# Patient Record
Sex: MALE | Race: BLACK OR AFRICAN AMERICAN | NOT HISPANIC OR LATINO | Employment: UNEMPLOYED | URBAN - METROPOLITAN AREA
[De-identification: names, ages, dates, MRNs, and addresses within clinical notes are randomized per-mention and may not be internally consistent; named-entity substitution may affect disease eponyms.]

---

## 2018-01-07 ENCOUNTER — HOSPITAL ENCOUNTER (EMERGENCY)
Facility: CLINIC | Age: 33
Discharge: HOME | End: 2018-01-07
Attending: PHYSICIAN ASSISTANT | Admitting: PHYSICIAN ASSISTANT
Payer: COMMERCIAL

## 2018-01-07 VITALS
RESPIRATION RATE: 18 BRPM | TEMPERATURE: 98.9 F | OXYGEN SATURATION: 99 % | DIASTOLIC BLOOD PRESSURE: 90 MMHG | SYSTOLIC BLOOD PRESSURE: 134 MMHG

## 2018-01-07 DIAGNOSIS — B34.9 VIRAL ILLNESS: ICD-10-CM

## 2018-01-07 DIAGNOSIS — R09.81 NASAL CONGESTION: ICD-10-CM

## 2018-01-07 LAB
FLUAV+FLUBV AG SPEC QL: NEGATIVE
FLUAV+FLUBV AG SPEC QL: NEGATIVE
SPECIMEN SOURCE: NORMAL

## 2018-01-07 PROCEDURE — 25000125 ZZHC RX 250: Performed by: PHYSICIAN ASSISTANT

## 2018-01-07 PROCEDURE — 87804 INFLUENZA ASSAY W/OPTIC: CPT | Performed by: EMERGENCY MEDICINE

## 2018-01-07 PROCEDURE — 99283 EMERGENCY DEPT VISIT LOW MDM: CPT

## 2018-01-07 RX ORDER — ONDANSETRON 4 MG/1
4 TABLET, ORALLY DISINTEGRATING ORAL ONCE
Status: COMPLETED | OUTPATIENT
Start: 2018-01-07 | End: 2018-01-07

## 2018-01-07 RX ORDER — ONDANSETRON 4 MG/1
4 TABLET, ORALLY DISINTEGRATING ORAL EVERY 8 HOURS PRN
Qty: 10 TABLET | Refills: 0 | Status: SHIPPED | OUTPATIENT
Start: 2018-01-07 | End: 2018-01-10

## 2018-01-07 RX ADMIN — ONDANSETRON 4 MG: 4 TABLET, ORALLY DISINTEGRATING ORAL at 16:55

## 2018-01-07 ASSESSMENT — ENCOUNTER SYMPTOMS
COUGH: 1
HEADACHES: 1
DIARRHEA: 0
VOMITING: 1
CONSTIPATION: 0
SORE THROAT: 1
FEVER: 1
APPETITE CHANGE: 0
ABDOMINAL PAIN: 0

## 2018-01-07 NOTE — ED AVS SNAPSHOT
[facility] Emergency Department    [address]    [facility] [address]    Phone:  [phone]    Fax:  [phone]                                       Bahman Wolf   MRN: [id number]    Department:  [facility] Emergency Department   Date of Visit:  1/7/2018           Patient Information     Date Of Birth          1985        Your diagnoses for this visit were:     Viral illness     Nasal congestion        You were seen by Domi Minaya PA-C.      Follow-up Information     Follow up with [facility] In 2 days.    Specialties:  Sports Medicine, Pain Management, Obstetrics & Gynecology, Pediatrics, Internal Medicine, Nephrology    Why:  As needed    Contact information:    [address]  [facility] [address] [phone]        Follow up with [facility] Emergency Department.    Specialty:  EMERGENCY MEDICINE    Why:  If symptoms worsen    Contact information:    [address]  [facility] [address] [phone]        Discharge Instructions         You can try an antihistamine plus decongestant (Allegra/Zyrtec/Claritin-D) for stuffy nose. Antihistamine dries up the runny nose and decongestant helps with the stuffiness. Also add on a steroid nasal spray (Flonase/Nasacort) for extra help.   You can take Ibuprofen 600 mg three times daily and/or Tylenol, alternating every 3-4 hours, for pain/fevers/body aches. No more than 4000 mg of Tylenol in 24 hours and no more than 3200 mg Ibuprofen in 24 hours.    Viral Syndrome (Adult)  A viral illness may cause a number of symptoms. The symptoms depend on the part of the body that the virus affects. If it settles in your nose, throat, and lungs, it may cause cough, sore throat, congestion, and sometimes headache. If it settles in your stomach and intestinal tract, it may cause vomiting and diarrhea. Sometimes it causes vague  "symptoms like \"aching all over,\" feeling tired, loss of appetite, or fever.  A viral illness usually lasts 1 to 2 weeks, but sometimes it lasts longer. In some cases, a more serious infection can look like a viral syndrome in the first few days of the illness. You may need another exam and additional tests to know the difference. Watch for the warning signs listed below.  Home care  Follow these guidelines for taking care of yourself at home:    If symptoms are severe, rest at home for the first 2 to 3 days.    Stay away from cigarette smoke - both your smoke and the smoke from others.    You may use over-the-counter acetaminophen or ibuprofen for fever, muscle aching, and headache, unless another medicine was prescribed for this. If you have chronic liver or kidney disease or ever had a stomach ulcer or GI bleeding, talk with your doctor before using these medicines. No one who is younger than 18 and ill with a fever should take aspirin. It may cause severe disease or death.    Your appetite may be poor, so a light diet is fine. Avoid dehydration by drinking 8 to 12 8-ounce glasses of fluids each day. This may include water; orange juice; lemonade; apple, grape, and cranberry juice; clear fruit drinks; electrolyte replacement and sports drinks; and decaffeinated teas and coffee. If you have been diagnosed with a kidney disease, ask your doctor how much and what types of fluids you should drink to prevent dehydration. If you have kidney disease, drinking too much fluid can cause it build up in the your body and be dangerous to your health.    Over-the-counter remedies won't shorten the length of the illness but may be helpful for cough, sore throat; and nasal and sinus congestion. Don't use decongestants if you have high blood pressure.  Follow-up care  Follow up with your healthcare provider if you do not improve over the next week.  Call 911  Get emergency medical care if any of the following " occur:    Convulsion    Feeling weak, dizzy, or like you are going to faint    Chest pain, shortness of breath, wheezing, or difficulty breathing  When to seek medical advice  Call your healthcare provider right away if any of these occur:    Cough with lots of colored sputum (mucus) or blood in your sputum    Chest pain, shortness of breath, wheezing, or difficulty breathing    Severe headache; face, neck, or ear pain    Severe, constant pain in the lower right side of your belly (abdominal)    Continued vomiting (can t keep liquids down)    Frequent diarrhea (more than 5 times a day); blood (red or black color) or mucus in diarrhea    Feeling weak, dizzy, or like you are going to faint    Extreme thirst    Fever of 100.4 F (38 C) or higher, or as directed by your healthcare provider  Date Last Reviewed: 9/25/2015 2000-2017 [employer]. [address] [place] [address]. All rights reserved. This information is not intended as a substitute for professional medical care. Always follow your healthcare professional's instructions.          24 Hour Appointment Hotline       To make an appointment at any [facility], call [phone] ([phone]). If you don't have a family doctor or clinic, we will help you find one. [facility] are conveniently located to serve the needs of you and your family.             Review of your medicines      START taking        Dose / Directions Last dose taken    ondansetron 4 MG ODT tab   Commonly known as:  ZOFRAN ODT   Dose:  4 mg   Quantity:  10 tablet        Take 1 tablet (4 mg) by mouth every 8 hours as needed   Refills:  0          Our records show that you are taking the medicines listed below. If these are incorrect, please call your family doctor or clinic.        Dose / Directions Last dose taken    dextromethorphan-guaiFENesin  MG per 12 hr tablet   Commonly known as:  MUCINEX DM   Dose:  1 tablet        Take 1 tablet by mouth  every 12 hours   Refills:  0        HYDROcodone-acetaminophen 5-325 MG per tablet   Commonly known as:  NORCO   Dose:  1-2 tablet   Quantity:  15 tablet        Take 1-2 tablets by mouth every 4 hours as needed for moderate to severe pain   Refills:  0        ibuprofen 600 MG tablet   Commonly known as:  ADVIL/MOTRIN   Dose:  600 mg   Quantity:  30 tablet        Take 1 tablet (600 mg) by mouth every 6 hours as needed for moderate pain   Refills:  1        oxyCODONE-acetaminophen 5-325 MG per tablet   Commonly known as:  PERCOCET   Dose:  1-2 tablet   Quantity:  30 tablet        Take 1-2 tablets by mouth every 4 hours as needed for moderate to severe pain   Refills:  0                Prescriptions were sent or printed at these locations (1 Prescription)                   Other Prescriptions                Printed at Department/Unit printer (1 of 1)         ondansetron (ZOFRAN ODT) 4 MG ODT tab                Procedures and tests performed during your visit     Influenza A/B antigen      Orders Needing Specimen Collection     None      Pending Results     No orders found from 1/5/2018 to 1/8/2018.            Pending Culture Results     No orders found from 1/5/2018 to 1/8/2018.            Pending Results Instructions     If you had any lab results that were not finalized at the time of your Discharge, you can call the ED Lab Result RN at [phone]. You will be contacted by this team for any positive Lab results or changes in treatment. The nurses are available 7 days a week from 10A to 6:30P.  You can leave a message 24 hours per day and they will return your call.        Test Results From Your Hospital Stay        1/7/2018  4:42 PM      Component Results     Component Value Ref Range & Units Status    Influenza A/B Agn Specimen Nasopharyngeal  Final    Influenza A Negative NEG^Negative Final    Influenza B Negative NEG^Negative Final    Test results must be correlated with clinical data. If necessary, results    should be confirmed by a molecular assay or viral culture.                  Clinical Quality Measure: Blood Pressure Screening     Your blood pressure was checked while you were in the emergency department today. The last reading we obtained was  BP: 134/90 . Please read the guidelines below about what these numbers mean and what you should do about them.  If your systolic blood pressure (the top number) is less than 120 and your diastolic blood pressure (the bottom number) is less than 80, then your blood pressure is normal. There is nothing more that you need to do about it.  If your systolic blood pressure (the top number) is 120-139 or your diastolic blood pressure (the bottom number) is 80-89, your blood pressure may be higher than it should be. You should have your blood pressure rechecked within a year by a primary care provider.  If your systolic blood pressure (the top number) is 140 or greater or your diastolic blood pressure (the bottom number) is 90 or greater, you may have high blood pressure. High blood pressure is treatable, but if left untreated over time it can put you at risk for heart attack, stroke, or kidney failure. You should have your blood pressure rechecked by a primary care provider within the next 4 weeks.  If your provider in the emergency department today gave you specific instructions to follow-up with your doctor or provider even sooner than that, you should follow that instruction and not wait for up to 4 weeks for your follow-up visit.        Thank you for choosing [facility]       Thank you for choosing [facility] for your care. Our goal is always to provide you with excellent care. Hearing back from our patients is one way we can continue to improve our services. Please take a few minutes to complete the written survey that you may receive in the mail after you visit with us. Thank you!        [employer] Information     [employer] lets you send messages to your doctor, view your test  "results, renew your prescriptions, schedule appointments and more. To sign up, go to [email or web address] . Click on \"Log in\" on the left side of the screen, which will take you to the Welcome page. Then click on \"Sign up Now\" on the right side of the page.     You will be asked to enter the access code listed below, as well as some personal information. Please follow the directions to create your username and password.     Your access code is: [email or web address]  Expires: 2018  4:57 PM     Your access code will  in 90 days. If you need help or a new code, please call your [facility] clinic or [phone].        Care EveryWhere ID     This is your Care EveryWhere ID. This could be used by other organizations to access your [facility] medical records  [id number]        Equal Access to Services     LINA MEADOWS : Hadjamal Lester, cierra sanon, sylwia fayealmaabbe mckeon, victor hugo cabrera . So [facility] [phone].    ATENCIÓN: Si habla español, tiene a anderson disposición servicios gratuitos de asistencia lingüística. Llame al [phone].    We comply with applicable federal civil rights laws and [place] laws. We do not discriminate on the basis of race, color, national origin, age, disability, sex, sexual orientation, or gender identity.            After Visit Summary       This is your record. Keep this with you and show to your community pharmacist(s) and doctor(s) at your next visit.                  "

## 2018-01-07 NOTE — ED AVS SNAPSHOT
[facility] Emergency Department    [address]    [facility] [address]    Phone:  [phone]    Fax:  [phone]                                       Bahman Wolf   MRN: [id number]    Department:  [facility] Emergency Department   Date of Visit:  1/7/2018           After Visit Summary Signature Page     I have received my discharge instructions, and my questions have been answered. I have discussed any challenges I see with this plan with the nurse or doctor.    ..........................................................................................................................................  Patient/Patient Representative Signature      ..........................................................................................................................................  Patient Representative Print Name and Relationship to Patient    ..................................................               ................................................  Date                                            Time    ..........................................................................................................................................  Reviewed by Signature/Title    ...................................................              ..............................................  Date                                                            Time

## 2018-01-07 NOTE — DISCHARGE INSTRUCTIONS
"  You can try an antihistamine plus decongestant (Allegra/Zyrtec/Claritin-D) for stuffy nose. Antihistamine dries up the runny nose and decongestant helps with the stuffiness. Also add on a steroid nasal spray (Flonase/Nasacort) for extra help.   You can take Ibuprofen 600 mg three times daily and/or Tylenol, alternating every 3-4 hours, for pain/fevers/body aches. No more than 4000 mg of Tylenol in 24 hours and no more than 3200 mg Ibuprofen in 24 hours.    Viral Syndrome (Adult)  A viral illness may cause a number of symptoms. The symptoms depend on the part of the body that the virus affects. If it settles in your nose, throat, and lungs, it may cause cough, sore throat, congestion, and sometimes headache. If it settles in your stomach and intestinal tract, it may cause vomiting and diarrhea. Sometimes it causes vague symptoms like \"aching all over,\" feeling tired, loss of appetite, or fever.  A viral illness usually lasts 1 to 2 weeks, but sometimes it lasts longer. In some cases, a more serious infection can look like a viral syndrome in the first few days of the illness. You may need another exam and additional tests to know the difference. Watch for the warning signs listed below.  Home care  Follow these guidelines for taking care of yourself at home:    If symptoms are severe, rest at home for the first 2 to 3 days.    Stay away from cigarette smoke - both your smoke and the smoke from others.    You may use over-the-counter acetaminophen or ibuprofen for fever, muscle aching, and headache, unless another medicine was prescribed for this. If you have chronic liver or kidney disease or ever had a stomach ulcer or GI bleeding, talk with your doctor before using these medicines. No one who is younger than 18 and ill with a fever should take aspirin. It may cause severe disease or death.    Your appetite may be poor, so a light diet is fine. Avoid dehydration by drinking 8 to 12 8-ounce glasses of fluids each " day. This may include water; orange juice; lemonade; apple, grape, and cranberry juice; clear fruit drinks; electrolyte replacement and sports drinks; and decaffeinated teas and coffee. If you have been diagnosed with a kidney disease, ask your doctor how much and what types of fluids you should drink to prevent dehydration. If you have kidney disease, drinking too much fluid can cause it build up in the your body and be dangerous to your health.    Over-the-counter remedies won't shorten the length of the illness but may be helpful for cough, sore throat; and nasal and sinus congestion. Don't use decongestants if you have high blood pressure.  Follow-up care  Follow up with your healthcare provider if you do not improve over the next week.  Call 911  Get emergency medical care if any of the following occur:    Convulsion    Feeling weak, dizzy, or like you are going to faint    Chest pain, shortness of breath, wheezing, or difficulty breathing  When to seek medical advice  Call your healthcare provider right away if any of these occur:    Cough with lots of colored sputum (mucus) or blood in your sputum    Chest pain, shortness of breath, wheezing, or difficulty breathing    Severe headache; face, neck, or ear pain    Severe, constant pain in the lower right side of your belly (abdominal)    Continued vomiting (can t keep liquids down)    Frequent diarrhea (more than 5 times a day); blood (red or black color) or mucus in diarrhea    Feeling weak, dizzy, or like you are going to faint    Extreme thirst    Fever of 100.4 F (38 C) or higher, or as directed by your healthcare provider  Date Last Reviewed: 9/25/2015 2000-2017 [employer]. [address] [place] [address], [place] [address]. All rights reserved. This information is not intended as a substitute for professional medical care. Always follow your healthcare professional's instructions.

## 2018-01-07 NOTE — ED PROVIDER NOTES
History     Chief Complaint:  Cough    HPI   Bahman Wolf is a 33 year old male who presents to the emergency department today for evaluation of a cough. The patient reports four days ago he developed cold like symptoms with associated subjective fever, congestion, sore throat, headache, and mild cough. Yesterday he began vomiting after eating and continues feeling somewhat nauseated. He had two episodes yesterday and two today as well that are associated with eating. The patient started taking Mucinex yesterday, however, with persistent symptoms he presents to the ED for further evaluation. He is still nauseated. The patient denies ear pain, diarrhea, abdominal pain, appetite change, and known sick contacts.     Allergies:  No Known Drug Allergies     Medications:    dextromethorphan-guaiFENesin (MUCINEX DM)  MG per 12 hr tablet    Past Medical History:    History reviewed. No pertinent past medical history.    Past Surgical History:    History reviewed. No pertinent surgical history.    Family History:    History reviewed. No pertinent family history.     Social History:  The patient was accompanied to the ED by his significant other.  Smoking Status: Former  Alcohol Use: No  Marital Status:  Single      Review of Systems   Constitutional: Positive for fever (subjective). Negative for appetite change.   HENT: Positive for congestion and sore throat. Negative for ear pain.    Respiratory: Positive for cough.    Gastrointestinal: Positive for vomiting. Negative for abdominal pain, constipation and diarrhea.   Neurological: Positive for headaches.   All other systems reviewed and are negative.    Physical Exam   First Vitals:  BP: 134/90  Heart Rate: 65  Temp: 98.9  F (37.2  C)  Resp: 18  SpO2: 99 %    Physical Exam  Constitutional: Alert, attentive  HENT:    Nose: Nose normal.    Mouth/Throat: Oropharynx is clear, mucous membranes are moist. Tonsils erythematous.   Eyes: EOM are normal. Pupils are  equal, round, and reactive to light.   CV: regular rate and rhythm  Chest: Effort normal and breath sounds normal.   GI:  There is no tenderness. No distension. Normal bowel sounds  MSK: Normal range of motion.   Neurological: Alert, attentive  Skin: Skin is warm and dry.     Emergency Department Course     Laboratory:  Laboratory findings were communicated with the patient and family who voiced understanding of the findings.  Influenza A/B Antigen: Negative     Interventions:  1655 Zofran 4mg PO      Emergency Department Course:  Nursing notes and vitals reviewed.  1648: I performed an exam of the patient as documented above. Patient updated on laboratory results.   Findings and plan explained to the Patient. Patient discharged home with instructions regarding supportive care, medications, and reasons to return. The importance of close follow-up was reviewed. The patient was prescribed zofran.   I personally reviewed the laboratory results with the Patient and answered all related questions prior to discharge.    Impression & Plan      Medical Decision Making:  Bahman Wolf is a 33 year old male who presents for evaluation of cough and fever. This is consistent with an influenza like illness.  The patient is out of treatment window for influenza and medications ordered as noted above. Influenza testing is negative. Patient understands the sensitivity of influenza rapid test.  They are at risk for pneumonia but no signs of this are detected on today's visit. No indication for antibiotics.  Close followup of primary care physician is indicated and return to the ED for high fevers > 103 for more than 48 hours more, increasing productive cough, shortness of breath, or confusion.  There is no signs of serious bacterial infection such as bacteremia, meningitis, UTI/pyelonephritis, strep pharyngitis, etc.      Diagnosis:    ICD-10-CM    1. Viral illness B34.9    2. Nasal congestion R09.81         Disposition:  Discharged to home    Discharge Medications:  New Prescriptions    ONDANSETRON (ZOFRAN ODT) 4 MG ODT TAB    Take 1 tablet (4 mg) by mouth every 8 hours as needed     Scribe Disclosure:  I, Elva Medeiros, am serving as a scribe at 4:23 PM on 1/7/2018 to document services personally performed by Domi Minaya PA-C based on my observations and the provider's statements to me.     1/7/2018   [facility] EMERGENCY DEPARTMENT       Domi Minaya PA-C  01/07/18 1715

## 2018-01-07 NOTE — ED NOTES
Reports nasal congestion progressively worsening x 4 days. Also reports has had some n/v over the past 2 days.

## 2018-04-05 ENCOUNTER — HOSPITAL ENCOUNTER (EMERGENCY)
Facility: CLINIC | Age: 33
Discharge: HOME | End: 2018-04-05
Attending: EMERGENCY MEDICINE | Admitting: EMERGENCY MEDICINE
Payer: COMMERCIAL

## 2018-04-05 VITALS
OXYGEN SATURATION: 98 % | HEART RATE: 72 BPM | RESPIRATION RATE: 14 BRPM | SYSTOLIC BLOOD PRESSURE: 138 MMHG | DIASTOLIC BLOOD PRESSURE: 78 MMHG | TEMPERATURE: 98.7 F

## 2018-04-05 DIAGNOSIS — R20.2 PARESTHESIAS: ICD-10-CM

## 2018-04-05 LAB
ANION GAP SERPL CALCULATED.3IONS-SCNC: 5 MMOL/L (ref 3–14)
BASOPHILS # BLD AUTO: 0.1 10E9/L (ref 0–0.2)
BASOPHILS NFR BLD AUTO: 1.1 %
BUN SERPL-MCNC: 15 MG/DL (ref 7–30)
CALCIUM SERPL-MCNC: 8.5 MG/DL (ref 8.5–10.1)
CHLORIDE SERPL-SCNC: 106 MMOL/L (ref 94–109)
CO2 SERPL-SCNC: 31 MMOL/L (ref 20–32)
CREAT SERPL-MCNC: 0.6 MG/DL (ref 0.66–1.25)
DIFFERENTIAL METHOD BLD: NORMAL
EOSINOPHIL # BLD AUTO: 0.4 10E9/L (ref 0–0.7)
EOSINOPHIL NFR BLD AUTO: 3.7 %
ERYTHROCYTE [DISTWIDTH] IN BLOOD BY AUTOMATED COUNT: 12.2 % (ref 10–15)
GFR SERPL CREATININE-BSD FRML MDRD: >90 ML/MIN/1.7M2
GLUCOSE SERPL-MCNC: 110 MG/DL (ref 70–99)
HCT VFR BLD AUTO: 43.5 % (ref 40–53)
HGB BLD-MCNC: 14.7 G/DL (ref 13.3–17.7)
IMM GRANULOCYTES # BLD: 0 10E9/L (ref 0–0.4)
IMM GRANULOCYTES NFR BLD: 0.2 %
LYMPHOCYTES # BLD AUTO: 3.3 10E9/L (ref 0.8–5.3)
LYMPHOCYTES NFR BLD AUTO: 33.5 %
MCH RBC QN AUTO: 28 PG (ref 26.5–33)
MCHC RBC AUTO-ENTMCNC: 33.8 G/DL (ref 31.5–36.5)
MCV RBC AUTO: 83 FL (ref 78–100)
MONOCYTES # BLD AUTO: 0.8 10E9/L (ref 0–1.3)
MONOCYTES NFR BLD AUTO: 7.6 %
NEUTROPHILS # BLD AUTO: 5.3 10E9/L (ref 1.6–8.3)
NEUTROPHILS NFR BLD AUTO: 53.9 %
NRBC # BLD AUTO: 0 10*3/UL
NRBC BLD AUTO-RTO: 0 /100
PLATELET # BLD AUTO: 310 10E9/L (ref 150–450)
POTASSIUM SERPL-SCNC: 3.5 MMOL/L (ref 3.4–5.3)
RBC # BLD AUTO: 5.25 10E12/L (ref 4.4–5.9)
SODIUM SERPL-SCNC: 142 MMOL/L (ref 133–144)
WBC # BLD AUTO: 9.9 10E9/L (ref 4–11)

## 2018-04-05 PROCEDURE — 80048 BASIC METABOLIC PNL TOTAL CA: CPT | Performed by: EMERGENCY MEDICINE

## 2018-04-05 PROCEDURE — 85025 COMPLETE CBC W/AUTO DIFF WBC: CPT | Performed by: EMERGENCY MEDICINE

## 2018-04-05 PROCEDURE — 25000132 ZZH RX MED GY IP 250 OP 250 PS 637: Performed by: EMERGENCY MEDICINE

## 2018-04-05 PROCEDURE — 99283 EMERGENCY DEPT VISIT LOW MDM: CPT

## 2018-04-05 RX ORDER — IBUPROFEN 600 MG/1
600 TABLET, FILM COATED ORAL ONCE
Status: COMPLETED | OUTPATIENT
Start: 2018-04-05 | End: 2018-04-05

## 2018-04-05 RX ADMIN — IBUPROFEN 600 MG: 600 TABLET ORAL at 23:43

## 2018-04-05 ASSESSMENT — ENCOUNTER SYMPTOMS
NAUSEA: 0
SEIZURES: 0
HEADACHES: 0
VOMITING: 0
FEVER: 0
SHORTNESS OF BREATH: 0
NUMBNESS: 1
WEAKNESS: 0

## 2018-04-05 NOTE — ED AVS SNAPSHOT
[facility] Emergency Department    [address]    [facility] [address]    Phone:  [phone]    Fax:  [phone]                                       Bahman Wolf   MRN: [id number]    Department:  [facility] Emergency Department   Date of Visit:  4/5/2018           After Visit Summary Signature Page     I have received my discharge instructions, and my questions have been answered. I have discussed any challenges I see with this plan with the nurse or doctor.    ..........................................................................................................................................  Patient/Patient Representative Signature      ..........................................................................................................................................  Patient Representative Print Name and Relationship to Patient    ..................................................               ................................................  Date                                            Time    ..........................................................................................................................................  Reviewed by Signature/Title    ...................................................              ..............................................  Date                                                            Time

## 2018-04-05 NOTE — ED AVS SNAPSHOT
" [facility] Emergency Department    [address] [place] [address]    Phone:  [phone]    Fax:  [phone]                                       Bahman Wolf   MRN: [id number]    Department:  [facility] Emergency Department   Date of Visit:  4/5/2018           Patient Information     Date Of Birth          1985        Your diagnoses for this visit were:     Paresthesias        You were seen by Rk Hebert MD.      Follow-up Information     Follow up with [facility] [place] In 2 days.    Specialty:  Family Practice    Contact information:    [address]  [place] [address]  [phone]          Follow up with [facility] Emergency Department.    Specialty:  EMERGENCY MEDICINE    Why:  if progressive numbness or weakness moving up the leg    Contact information:    [address] [place] [address]  [phone]        Discharge Instructions         Paraesthesias  Paraesthesia is a burning or prickling sensation that is sometimes felt in the hands, arms, legs or feet. It can also occur in other parts of the body. It can also feel like tingling or numbness, skin crawling, or itching. The feeling is not comfortable, but it is not painful. (The \"pins and needles\" feeling that happens when a foot or hand \"falls asleep\" is a temporary paraesthesia.)  Paraesthesias that last or come and go may be caused by medical issues that need to be treated. These include stroke, a bulging disk pressing on a nerve, a trapped nerve, vitamin deficiencies, or even certain medicines.  Tests are often done. These tests may include blood tests, X-ray, CT (computerized tomography) scan, or a muscle test (electromyography). Depending on the cause, treatment may include physical therapy.  Home care    Tell the healthcare provider about all medicines you take. This includes prescription and over-the-counter medicines, vitamins, " and herbs. Ask if any of the medicines may be causing your problems. Do not make any changes to prescription medicines without talking to your healthcare provider first.    You may be prescribed medicines to help relieve the tingling feeling or for pain. Take all medicines as directed.    A numb hand or foot may be more prone to injury. To help protect it:    Always use oven mitts.    Test water with an unaffected hand or foot.    Use caution when trimming nails. File sharp areas.    Wear shoes that fit well to avoid pressure points, blisters, and ulcers.    Inspect your hands and feet carefully (including the soles of your feet and between your toes) at least once a week. If you see red areas, sores, or other problems, tell your healthcare provider.  Follow-up care  Follow up with your doctor or as advised by our staff. You may need further testing or evaluation.  When to seek medical advice  Call your healthcare provider right away if any of the following occur:    Numbness or weakness of the face, one arm, or one leg    Slurred speech, confusion, trouble speaking, walking, or seeing    Severe headache, fainting spell, dizziness, or seizure    Chest, arm, neck, or upper back pain    Loss of bladder or bowel control    Open wound with redness, swelling, or pus  Date Last Reviewed: 9/25/2015 2000-2017 [employer]. [address] [place] [address]. All rights reserved. This information is not intended as a substitute for professional medical care. Always follow your healthcare professional's instructions.          24 Hour Appointment Hotline       To make an appointment at any [facility], call [phone] ([phone]). If you don't have a family doctor or clinic, we will help you find one. [facility] clinics are conveniently located to serve the needs of you and your family.             Review of your medicines      Our records show that you are taking the medicines listed below.  If these are incorrect, please call your family doctor or clinic.        Dose / Directions Last dose taken    dextromethorphan-guaiFENesin  MG per 12 hr tablet   Commonly known as:  MUCINEX DM   Dose:  1 tablet        Take 1 tablet by mouth every 12 hours   Refills:  0        HYDROcodone-acetaminophen 5-325 MG per tablet   Commonly known as:  NORCO   Dose:  1-2 tablet   Quantity:  15 tablet        Take 1-2 tablets by mouth every 4 hours as needed for moderate to severe pain   Refills:  0        ibuprofen 600 MG tablet   Commonly known as:  ADVIL/MOTRIN   Dose:  600 mg   Quantity:  30 tablet        Take 1 tablet (600 mg) by mouth every 6 hours as needed for moderate pain   Refills:  1        oxyCODONE-acetaminophen 5-325 MG per tablet   Commonly known as:  PERCOCET   Dose:  1-2 tablet   Quantity:  30 tablet        Take 1-2 tablets by mouth every 4 hours as needed for moderate to severe pain   Refills:  0                Procedures and tests performed during your visit     Basic metabolic panel (BMP)    CBC + differential      Orders Needing Specimen Collection     None      Pending Results     No orders found from 4/3/2018 to 4/6/2018.            Pending Culture Results     No orders found from 4/3/2018 to 4/6/2018.            Pending Results Instructions     If you had any lab results that were not finalized at the time of your Discharge, you can call the ED Lab Result RN at [phone]. You will be contacted by this team for any positive Lab results or changes in treatment. The nurses are available 7 days a week from 10A to 6:30P.  You can leave a message 24 hours per day and they will return your call.        Test Results From Your Hospital Stay        4/5/2018 10:28 PM      Component Results     Component Value Ref Range & Units Status    WBC 9.9 4.0 - 11.0 10e9/L Final    RBC Count 5.25 4.4 - 5.9 10e12/L Final    Hemoglobin 14.7 13.3 - 17.7 g/dL Final    Hematocrit 43.5 40.0 - 53.0 % Final    MCV 83 78 -  100 fl Final    MCH 28.0 26.5 - 33.0 pg Final    MCHC 33.8 31.5 - 36.5 g/dL Final    RDW 12.2 10.0 - 15.0 % Final    Platelet Count 310 150 - 450 10e9/L Final    Diff Method Automated Method  Final    % Neutrophils 53.9 % Final    % Lymphocytes 33.5 % Final    % Monocytes 7.6 % Final    % Eosinophils 3.7 % Final    % Basophils 1.1 % Final    % Immature Granulocytes 0.2 % Final    Nucleated RBCs 0 0 /100 Final    Absolute Neutrophil 5.3 1.6 - 8.3 10e9/L Final    Absolute Lymphocytes 3.3 0.8 - 5.3 10e9/L Final    Absolute Monocytes 0.8 0.0 - 1.3 10e9/L Final    Absolute Eosinophils 0.4 0.0 - 0.7 10e9/L Final    Absolute Basophils 0.1 0.0 - 0.2 10e9/L Final    Abs Immature Granulocytes 0.0 0 - 0.4 10e9/L Final    Absolute Nucleated RBC 0.0  Final         4/5/2018 10:48 PM      Component Results     Component Value Ref Range & Units Status    Sodium 142 133 - 144 mmol/L Final    Potassium 3.5 3.4 - 5.3 mmol/L Final    Chloride 106 94 - 109 mmol/L Final    Carbon Dioxide 31 20 - 32 mmol/L Final    Anion Gap 5 3 - 14 mmol/L Final    Glucose 110 (H) 70 - 99 mg/dL Final    Urea Nitrogen 15 7 - 30 mg/dL Final    Creatinine 0.60 (L) 0.66 - 1.25 mg/dL Final    GFR Estimate >90 >60 mL/min/1.7m2 Final    Non  GFR Calc    GFR Estimate If Black >90 >60 mL/min/1.7m2 Final    African American GFR Calc    Calcium 8.5 8.5 - 10.1 mg/dL Final                Clinical Quality Measure: Blood Pressure Screening     Your blood pressure was checked while you were in the emergency department today. The last reading we obtained was  BP: 144/86 . Please read the guidelines below about what these numbers mean and what you should do about them.  If your systolic blood pressure (the top number) is less than 120 and your diastolic blood pressure (the bottom number) is less than 80, then your blood pressure is normal. There is nothing more that you need to do about it.  If your systolic blood pressure (the top number) is 120-139 or  "your diastolic blood pressure (the bottom number) is 80-89, your blood pressure may be higher than it should be. You should have your blood pressure rechecked within a year by a primary care provider.  If your systolic blood pressure (the top number) is 140 or greater or your diastolic blood pressure (the bottom number) is 90 or greater, you may have high blood pressure. High blood pressure is treatable, but if left untreated over time it can put you at risk for heart attack, stroke, or kidney failure. You should have your blood pressure rechecked by a primary care provider within the next 4 weeks.  If your provider in the emergency department today gave you specific instructions to follow-up with your doctor or provider even sooner than that, you should follow that instruction and not wait for up to 4 weeks for your follow-up visit.        Thank you for choosing [facility]       Thank you for choosing [facility] for your care. Our goal is always to provide you with excellent care. Hearing back from our patients is one way we can continue to improve our services. Please take a few minutes to complete the written survey that you may receive in the mail after you visit with us. Thank you!        [employer] Information     [employer] lets you send messages to your doctor, view your test results, renew your prescriptions, schedule appointments and more. To sign up, go to [email or web address] . Click on \"Log in\" on the left side of the screen, which will take you to the Welcome page. Then click on \"Sign up Now\" on the right side of the page.     You will be asked to enter the access code listed below, as well as some personal information. Please follow the directions to create your username and password.     Your access code is: [email or web address]  Expires: 2018  5:57 PM     Your access code will  in 90 days. If you need help or a new code, please call your [facility] clinic or [phone].        Care EveryWhere ID     This " is your Care EveryWhere ID. This could be used by other organizations to access your [facility] medical records  [id number]        Equal Access to Services     LINA MEADOWS : Joel Lester, cierra sanon, sylwia mckeon, victor hugo inchols. So [facility] [phone].    ATENCIÓN: Si habla español, tiene a anderson disposición servicios gratuitos de asistencia lingüística. Llame al [phone].    We comply with applicable federal civil rights laws and [place] laws. We do not discriminate on the basis of race, color, national origin, age, disability, sex, sexual orientation, or gender identity.            After Visit Summary       This is your record. Keep this with you and show to your community pharmacist(s) and doctor(s) at your next visit.

## 2018-04-06 NOTE — DISCHARGE INSTRUCTIONS
"  Paraesthesias  Paraesthesia is a burning or prickling sensation that is sometimes felt in the hands, arms, legs or feet. It can also occur in other parts of the body. It can also feel like tingling or numbness, skin crawling, or itching. The feeling is not comfortable, but it is not painful. (The \"pins and needles\" feeling that happens when a foot or hand \"falls asleep\" is a temporary paraesthesia.)  Paraesthesias that last or come and go may be caused by medical issues that need to be treated. These include stroke, a bulging disk pressing on a nerve, a trapped nerve, vitamin deficiencies, or even certain medicines.  Tests are often done. These tests may include blood tests, X-ray, CT (computerized tomography) scan, or a muscle test (electromyography). Depending on the cause, treatment may include physical therapy.  Home care    Tell the healthcare provider about all medicines you take. This includes prescription and over-the-counter medicines, vitamins, and herbs. Ask if any of the medicines may be causing your problems. Do not make any changes to prescription medicines without talking to your healthcare provider first.    You may be prescribed medicines to help relieve the tingling feeling or for pain. Take all medicines as directed.    A numb hand or foot may be more prone to injury. To help protect it:    Always use oven mitts.    Test water with an unaffected hand or foot.    Use caution when trimming nails. File sharp areas.    Wear shoes that fit well to avoid pressure points, blisters, and ulcers.    Inspect your hands and feet carefully (including the soles of your feet and between your toes) at least once a week. If you see red areas, sores, or other problems, tell your healthcare provider.  Follow-up care  Follow up with your doctor or as advised by our staff. You may need further testing or evaluation.  When to seek medical advice  Call your healthcare provider right away if any of the following " occur:    Numbness or weakness of the face, one arm, or one leg    Slurred speech, confusion, trouble speaking, walking, or seeing    Severe headache, fainting spell, dizziness, or seizure    Chest, arm, neck, or upper back pain    Loss of bladder or bowel control    Open wound with redness, swelling, or pus  Date Last Reviewed: 9/25/2015 2000-2017 [employer]. [address] [place] [address]. All rights reserved. This information is not intended as a substitute for professional medical care. Always follow your healthcare professional's instructions.

## 2018-04-06 NOTE — ED PROVIDER NOTES
History     Chief Complaint:  Numbness    HPI   Bahman Wolf is a 33-year-old male who presents with numbness on the lateral sides of the bottoms of both feet. The patient reports that he has had this for three days. He has never had this in the past. He reports that he is working on his feet for 4-5 hour per day. He has no numbness anywhere else in his body. He denies feeling any burning sensation. The patient has no history of diabetes. He has had no new environmental changes such as new shoes.       Allergies:  No known drug allergies.     Medications:    No daily medications.     Past Medical History:    History reviewed. No pertinent medical history.     Past Surgical History:    Surgical history reviewed. No pertinent surgical history.    Family History:    History reviewed. No pertinent family history.     Social History:  Marital Status: Single   Presents to the ED with family member  Tobacco Use: Former smoker   Alcohol Use: No     Review of Systems   Constitutional: Negative for fever.   Respiratory: Negative for shortness of breath.    Cardiovascular: Negative for chest pain.   Gastrointestinal: Negative for nausea and vomiting.   Neurological: Positive for numbness (lateral side of the bottom of the feet.). Negative for seizures, syncope, weakness and headaches.   All other systems reviewed and are negative.    Physical Exam   First Vitals:  BP: 144/86  Pulse: 72  Temp: 98.7  F (37.1  C)  Resp: 16  SpO2: 98 %    Physical Exam  Vital signs and nursing notes reviewed.     Constitutional: laying on gurney appears comfortable  HENT: Oropharynx is clear and moist  Eyes: Conjunctivae are normal bilaterally. Pupils equal  Neck: normal range of motion  Cardiovascular: Normal rate  Pulmonary/Chest: Effort normal   Musculoskeletal: No joint swelling or edema.   Neurological: Alert and oriented. States he has numbness on the lateral side of both feet. No numbness to the midfoot or dorsum of the foot. No  "focal weakness in extremities, no other areas of \"numbness\"  Skin: Skin is warm and dry. No rash noted.   Psych: normal affect.     Emergency Department Course   Laboratory:  Blood:  CBC:  WBC 9.9, HGB 14.7, , otherwise WNL  BMP: Glc 110, otherwise WNL (Creatinine 0.60 (L))     Interventions:  (7177) Ibuprofen, 600 mg, PO     Emergency Department Course:  Nursing notes and vitals reviewed.    (0867) I entered the room with my scribe, obtained the history, and performed an exam of the patient as documented above.    A peripheral IV was established. Blood was drawn from the patient. This was sent for laboratory testing, findings above.      (6299) I personally reviewed the laboratory results with the patient and answered all related questions prior to discharge.      Findings and plan explained to the patient. Patient discharged home with instructions regarding supportive care, medications, and reasons to return. The importance of close follow-up was reviewed.     Impression & Plan    Medical Decision Making:  Bahman Wolf is a 33-year-old male who presents with complaints of numbness on the outsides of the bottom of both feet.  On examination he has no other areas of paresthesias.  He has no weakness.  There is no swelling or redness on the feet.  Basic lab tests were obtained which were negative.  It is possible that this could be more of a compression issue.  He does have wide feet I advised him to make sure that he does not have narrow shoes that may be contributing to his symptoms.  He should try to avoid standing for several hours at a time and follow-up closely with his primary care physician.  He was advised that if he develops any progressive numbness moving up the leg or weakness, redness, swelling, fever or other complaints he is to return here.      Diagnosis:    ICD-10-CM    1. Paresthesias R20.2        Disposition:  Discharge       [facility] EMERGENCY DEPARTMENT      Scribe " disclosure:  I, Adrien Irizarry, am serving as a scribe on 4/5/2018 at 10:19 PM to personally document services performed by Dr. Hebert based on my observations and the provider's statements to me.                Rk Hebert MD  04/06/18 0438

## 2019-12-30 ENCOUNTER — TRANSFERRED RECORDS (OUTPATIENT)
Dept: HEALTH INFORMATION MANAGEMENT | Facility: CLINIC | Age: 34
End: 2019-12-30

## 2020-12-24 ENCOUNTER — PATIENT OUTREACH (OUTPATIENT)
Dept: CARE COORDINATION | Facility: CLINIC | Age: 35
End: 2020-12-24

## 2020-12-24 ENCOUNTER — HOSPITAL ENCOUNTER (EMERGENCY)
Facility: CLINIC | Age: 35
Discharge: HOME | End: 2020-12-24
Attending: EMERGENCY MEDICINE | Admitting: EMERGENCY MEDICINE
Payer: COMMERCIAL

## 2020-12-24 ENCOUNTER — APPOINTMENT (OUTPATIENT)
Dept: GENERAL RADIOLOGY | Facility: CLINIC | Age: 35
End: 2020-12-24
Attending: EMERGENCY MEDICINE
Payer: COMMERCIAL

## 2020-12-24 VITALS
HEART RATE: 81 BPM | OXYGEN SATURATION: 96 % | SYSTOLIC BLOOD PRESSURE: 120 MMHG | TEMPERATURE: 99.9 F | RESPIRATION RATE: 8 BRPM | DIASTOLIC BLOOD PRESSURE: 92 MMHG

## 2020-12-24 DIAGNOSIS — U07.1 LAB TEST POSITIVE FOR DETECTION OF COVID-19 VIRUS: Primary | ICD-10-CM

## 2020-12-24 DIAGNOSIS — R05.9 COUGH: ICD-10-CM

## 2020-12-24 DIAGNOSIS — J12.82 PNEUMONIA DUE TO 2019 NOVEL CORONAVIRUS: Primary | ICD-10-CM

## 2020-12-24 DIAGNOSIS — U07.1 PNEUMONIA DUE TO 2019 NOVEL CORONAVIRUS: Primary | ICD-10-CM

## 2020-12-24 LAB
ANION GAP SERPL CALCULATED.3IONS-SCNC: 1 MMOL/L (ref 3–14)
BASOPHILS # BLD AUTO: 0 10E9/L (ref 0–0.2)
BASOPHILS NFR BLD AUTO: 0.5 %
BUN SERPL-MCNC: 6 MG/DL (ref 7–30)
CALCIUM SERPL-MCNC: 7.3 MG/DL (ref 8.5–10.1)
CHLORIDE SERPL-SCNC: 108 MMOL/L (ref 94–109)
CO2 SERPL-SCNC: 31 MMOL/L (ref 20–32)
CREAT SERPL-MCNC: 0.59 MG/DL (ref 0.66–1.25)
DIFFERENTIAL METHOD BLD: ABNORMAL
EOSINOPHIL # BLD AUTO: 0 10E9/L (ref 0–0.7)
EOSINOPHIL NFR BLD AUTO: 1 %
ERYTHROCYTE [DISTWIDTH] IN BLOOD BY AUTOMATED COUNT: 12.1 % (ref 10–15)
GFR SERPL CREATININE-BSD FRML MDRD: >90 ML/MIN/{1.73_M2}
GLUCOSE SERPL-MCNC: 142 MG/DL (ref 70–99)
HCT VFR BLD AUTO: 38.8 % (ref 40–53)
HGB BLD-MCNC: 12.7 G/DL (ref 13.3–17.7)
IMM GRANULOCYTES # BLD: 0 10E9/L (ref 0–0.4)
IMM GRANULOCYTES NFR BLD: 0.2 %
INTERPRETATION ECG - MUSE: NORMAL
LYMPHOCYTES # BLD AUTO: 1.5 10E9/L (ref 0.8–5.3)
LYMPHOCYTES NFR BLD AUTO: 36.2 %
MCH RBC QN AUTO: 27.5 PG (ref 26.5–33)
MCHC RBC AUTO-ENTMCNC: 32.7 G/DL (ref 31.5–36.5)
MCV RBC AUTO: 84 FL (ref 78–100)
MONOCYTES # BLD AUTO: 0.4 10E9/L (ref 0–1.3)
MONOCYTES NFR BLD AUTO: 8.6 %
NEUTROPHILS # BLD AUTO: 2.2 10E9/L (ref 1.6–8.3)
NEUTROPHILS NFR BLD AUTO: 53.5 %
NRBC # BLD AUTO: 0 10*3/UL
NRBC BLD AUTO-RTO: 0 /100
PLATELET # BLD AUTO: 190 10E9/L (ref 150–450)
POTASSIUM SERPL-SCNC: 3.3 MMOL/L (ref 3.4–5.3)
RBC # BLD AUTO: 4.61 10E12/L (ref 4.4–5.9)
SODIUM SERPL-SCNC: 140 MMOL/L (ref 133–144)
TROPONIN I SERPL-MCNC: <0.015 UG/L (ref 0–0.04)
WBC # BLD AUTO: 4.1 10E9/L (ref 4–11)

## 2020-12-24 PROCEDURE — 258N000003 HC RX IP 258 OP 636: Performed by: EMERGENCY MEDICINE

## 2020-12-24 PROCEDURE — 80048 BASIC METABOLIC PNL TOTAL CA: CPT | Performed by: EMERGENCY MEDICINE

## 2020-12-24 PROCEDURE — 71045 X-RAY EXAM CHEST 1 VIEW: CPT

## 2020-12-24 PROCEDURE — 85025 COMPLETE CBC W/AUTO DIFF WBC: CPT | Performed by: EMERGENCY MEDICINE

## 2020-12-24 PROCEDURE — 99285 EMERGENCY DEPT VISIT HI MDM: CPT | Mod: 25

## 2020-12-24 PROCEDURE — 93005 ELECTROCARDIOGRAM TRACING: CPT

## 2020-12-24 PROCEDURE — 96374 THER/PROPH/DIAG INJ IV PUSH: CPT

## 2020-12-24 PROCEDURE — 250N000013 HC RX MED GY IP 250 OP 250 PS 637: Performed by: EMERGENCY MEDICINE

## 2020-12-24 PROCEDURE — 84484 ASSAY OF TROPONIN QUANT: CPT | Performed by: EMERGENCY MEDICINE

## 2020-12-24 PROCEDURE — 250N000011 HC RX IP 250 OP 636: Performed by: EMERGENCY MEDICINE

## 2020-12-24 PROCEDURE — 96361 HYDRATE IV INFUSION ADD-ON: CPT

## 2020-12-24 RX ORDER — ALBUTEROL SULFATE 90 UG/1
2 AEROSOL, METERED RESPIRATORY (INHALATION) EVERY 4 HOURS PRN
Qty: 6.7 G | Refills: 0 | Status: ON HOLD | OUTPATIENT
Start: 2020-12-24 | End: 2020-12-31

## 2020-12-24 RX ORDER — ACETAMINOPHEN 500 MG
1000 TABLET ORAL ONCE
Status: COMPLETED | OUTPATIENT
Start: 2020-12-24 | End: 2020-12-24

## 2020-12-24 RX ORDER — ALBUTEROL SULFATE 90 UG/1
2 AEROSOL, METERED RESPIRATORY (INHALATION) ONCE
Status: COMPLETED | OUTPATIENT
Start: 2020-12-24 | End: 2020-12-24

## 2020-12-24 RX ORDER — KETOROLAC TROMETHAMINE 15 MG/ML
15 INJECTION, SOLUTION INTRAMUSCULAR; INTRAVENOUS ONCE
Status: COMPLETED | OUTPATIENT
Start: 2020-12-24 | End: 2020-12-24

## 2020-12-24 RX ADMIN — KETOROLAC TROMETHAMINE 15 MG: 15 INJECTION, SOLUTION INTRAMUSCULAR; INTRAVENOUS at 04:21

## 2020-12-24 RX ADMIN — SODIUM CHLORIDE 1000 ML: 9 INJECTION, SOLUTION INTRAVENOUS at 01:18

## 2020-12-24 RX ADMIN — ACETAMINOPHEN 1000 MG: 500 TABLET, FILM COATED ORAL at 01:15

## 2020-12-24 RX ADMIN — ALBUTEROL SULFATE 2 PUFF: 90 AEROSOL, METERED RESPIRATORY (INHALATION) at 01:16

## 2020-12-24 ASSESSMENT — ENCOUNTER SYMPTOMS
VOMITING: 0
COUGH: 1
FEVER: 1
DIARRHEA: 0

## 2020-12-24 NOTE — ED TRIAGE NOTES
Pt arrives via [facility] from home who state pt tested COVID + on 12/17. State that pt had worsening SOB and cough tonight. Took tylenol at 99.9.   On arrival to ED O2 sats 99% RA. Temp 99.9. Coughing intermittently.

## 2020-12-24 NOTE — DISCHARGE INSTRUCTIONS
There are signs of pneumonia on your imaging today that is from covid  Use inhaler at home as needed  Tylenol and ibuprofen as needed for fever, muscle aches  Your oxygen level should be above 90% at home, if dropping below 90% and not improving, return to ED  I placed an order for someone to get in touch with you to see how you are feeling  Stay hydrated at home    Discharge Instructions  COVID-19    COVID-19 is the disease caused by a new coronavirus. The virus spreads from person-to-person primarily by droplets when an infected person coughs or sneezes and the droplet either lands on another person or that other person touches a surface with the droplet on it. There are tests available to diagnose COVID-19. There is no specific treatment or medicine for the disease.    You may have been diagnosed with COVID, may be being tested for COVID and have a pending test result, or may have been exposed to COVID.    Symptoms of COVID-19    Many people have no symptoms or mild symptoms.  Symptoms may usually appear 4 to 5 days (up to 14 days) after contact with a person with COVID-19. Some people will get severe symptoms and pneumonia. Usual symptoms are:     ? Fever  ? Cough  ? Trouble breathing    Less common symptoms are: Headache, body aches, sore throat, sneezing, diarrhea, loss of taste or smell.    Isolation and Quarantine    You were seen because you have symptoms, had an exposure, or had some other concern about possible COVID. The best way to stop the spread of the virus is to avoid contact with others.  Isolation refers to sick people staying away from people who are not sick. A person in quarantine is limiting activity because they were exposed and are waiting to see if they might become sick.    If you test positive for COVID, you should stay home (isolation) for at least 10 days after your symptoms began, and for 24 hours with no fever and improvement of symptoms--whichever is longer. (Your fever should be  gone for 24 hours without using fever-reducing medicine). If you have no symptoms, you should stay home (isolation) for 10 days from the day of the test.    For example, if you have a fever and cough for 6 days, you need to stay home 4 more days with no fever for a total of 10 days. Or, if you have a fever and cough for 10 days, you need to stay home one more day with no fever for a total of 11 days.    If you have a high-risk exposure to COVID (you spent 15 minutes or more within six feet of somebody who has COVID), you should stay home (quarantine) for 14 days. Even if you test negative for COVID, the CDC recommends a 14-day quarantine from the time of your last exposure to that individual. There are options for a shortened (<14 day quarantine) you can review at:    https://www.health.[facility]./diseases/coronavirus/close.html#long    If you have symptoms but a negative test, you should stay at home until you are symptom-free and without fever for 24 hours, using the same judgment you would for when it is safe to return to work/school from strep throat, influenza, or the common cold. If you worsen, you should consider being re-evaluated.    If you are being tested for COVID and your test is pending, you should stay home until you know your test result.    How should I protect myself and others?    Do not go to work or school. Have a friend or relative do your shopping. Do not use public transportation (bus, train) or ridesharing (Lyft, Uber).    Separate yourself from other people in your home. As much as possible, you should stay in one room and away from other people in your home. Also, use a separate bathroom, if possible. Avoid handling pets or other animals while sick.     Wear a facemask if you need to be around other people and cover your mouth and nose with a tissue when you cough or sneeze.     Avoid sharing personal household items. You should not share dishes, drinking glasses, forks/knives/spoons,  towels, or bedding with other people in your home. After using these items, they should be washed with soap and water. Clean parts of your home that are touched often (doorknobs, faucets, countertops, etc.) daily.     Wash your hands often with soap and water for at least 20 seconds or use an alcohol-based hand  containing at least 60% alcohol.     Avoid touching your face.    Treat your symptoms. You can take Acetaminophen (Tylenol) to treat body aches and fever as needed for comfort. Ibuprofen (Advil or Motrin) can be used as well if you still have symptoms after taking Tylenol. Drink fluids. Rest.    Watch for worsening symptoms such as shortness of breath/difficulty breathing or very severe weakness.    Employers/workplaces are being asked by the Centers for Disease Control (CDC) to not request notes/documentation for you to return to work or prove that you were ill. You may choose to show your employer this paperwork. Also, repeat testing should not be required to return to work.    Return to the Emergency Department if:    If you are developing worsening breathing, shortness of breath, or feel worse you should seek medical attention.  If you are uncertain, contact your health care provider/clinic. If you need emergency medical attention, call 911 and tell them you have been ill.

## 2020-12-24 NOTE — PROGRESS NOTES
Clinic Care Coordination Contact  [facility]/Voicemail    Referral Source: ED Follow-Up  Notified of a [facility] ED visit 12/24/2020   Pneumonia and positive COVID results  COVID-19 GetWell Loop:   Testing Results: Positive   Email on file OR Smartphone: Yes   Does the patient speak English: Yes   Is the patient pregnant: N/A   Candidate for GetWell Loop: Yes   Referral placed previously by hospital staff     Clinical Data: Care Coordinator Outreach  Outreach attempted x 1.  Left message on patient's voicemail with call back information and requested return call.  Left a message on patients VM to verify PCP  [facility] [place]?    Plan:Care Coordinator will try to reach patient again in 1-2 business days.    [facility]   Kathryn Arce RN, Care Coordinator   [facility]   E-mail [email or web address]   [phone]

## 2020-12-24 NOTE — ED AVS SNAPSHOT
[facility] Emergency Dept  [address]  [facility] [address]  Phone: [phone]  Fax: [phone]                                    Bahman Wolf   MRN: [id number]    Department: [facility] Emergency Dept   Date of Visit: 12/24/2020           After Visit Summary Signature Page    I have received my discharge instructions, and my questions have been answered. I have discussed any challenges I see with this plan with the nurse or doctor.    ..........................................................................................................................................  Patient/Patient Representative Signature      ..........................................................................................................................................  Patient Representative Print Name and Relationship to Patient    ..................................................               ................................................  Date                                   Time    ..........................................................................................................................................  Reviewed by Signature/Title    ...................................................              ..............................................  Date                                               Time          22EPIC Rev 08/18

## 2020-12-24 NOTE — ED PROVIDER NOTES
History   Chief Complaint:  Cough       HPI   Bahman Wolf is a 35 year old male who presents with cough. The patient was diagnosed with Covid-19 on 12/17. His worst symptom has been his cough and he states that he can't breath when he starts coughing. He additionally notes some mild chest discomfort and an intermittent fever. He denies any vomiting or diarrhea.     Review of Systems   Constitutional: Positive for fever.   Respiratory: Positive for cough.    Gastrointestinal: Negative for diarrhea and vomiting.   All other systems reviewed and are negative.    Allergies:  The patient has no known allergies.     Medications:  The patient is currently on no regular medications.    Past Medical History:    Covid-19   Chronic pain of bilateral knees  Chronic midline low back pain with bilateral sciatica.    Social History:  Presents to the ED: via EMS    Tobacco use: Former    Physical Exam     Patient Vitals for the past 24 hrs:   BP Temp Temp src Pulse Resp SpO2   12/24/20 0315 (!) 173/96 -- -- 84 24 95 %   12/24/20 0305 -- -- -- 85 -- --   12/24/20 0300 (!) 157/100 -- -- 89 -- --   12/24/20 0255 -- -- -- 90 (!) 38 94 %   12/24/20 0250 -- -- -- 90 20 97 %   12/24/20 0245 (!) 151/88 -- -- 85 26 94 %   12/24/20 0240 -- -- -- 81 25 98 %   12/24/20 0235 (!) 146/86 -- -- 82 21 96 %   12/24/20 0230 (!) 146/86 -- -- 83 -- 94 %   12/24/20 0225 -- -- -- -- -- 93 %   12/24/20 0220 -- -- -- -- -- 94 %   12/24/20 0215 (!) 143/88 -- -- 82 -- 94 %   12/24/20 0205 -- -- -- -- -- 95 %   12/24/20 0200 (!) 150/87 -- -- 83 -- 95 %   12/24/20 0155 -- -- -- 86 -- 94 %   12/24/20 0145 (!) 149/86 -- -- 87 -- 94 %   12/24/20 0130 136/80 -- -- 87 (!) 31 95 %   12/24/20 0115 126/77 -- -- 85 16 95 %   12/24/20 0101 -- -- -- 90 -- 96 %   12/24/20 0040 121/75 -- -- -- -- --   12/24/20 0037 -- 99.9  F (37.7  C) Oral 88 20 93 %       Physical Exam  General: Sitting up in bed  Eyes:  The pupils are equal and round    Conjunctivae and  sclerae are normal  ENT:    Wearing a mask  Neck:  Normal range of motion  CV:  Regular rate, regular rhythm    Skin warm and well perfused   Resp:  Non labored breathing on room air when not coughing    Frequent dry cough heard and when coughing feels more short of breath  GI:  Abdomen is soft, there is no rigidity    No distension    No rebound tenderness     No abdominal tenderness  MS:  Normal muscular tone  Skin:  No rash or acute skin lesions noted  Neuro:   Awake, alert.      Speech is normal and fluent.    Face is symmetric.     Moves all extremities equally  Psych: Normal affect.  Appropriate interactions.    Emergency Department Course     ECG:  Indication: Shortness of breath   Time: 0037  Vent. Rate 8 bpm. OK interval 144. QRS duration 82. QT/QTc 358/433. P-R-T axis 8 -17 21.  normal sinus rhythm. Minimal voltage criteria for LVH, may be normal variant. Borderline ECG. No significant change compared to EKG dated 5/15/15. Read time: 0045     Imaging:    XR Chest 1 view PORT:   1.  A few apparent ill-defined hazy opacities scattered in the mid and lower lungs bilaterally. These could be artifactual and related to overlying soft tissue, but an infectious or inflammatory process is also possible.   2.  No other findings suspicious for active cardiopulmonary disease, as per radiology.      Laboratory:  CBC: WBC: 4.1, HGB: 12.7 (L), PLT: 190  BMP: Glucose 142 (H), Potassium: 3.3 (L), Anion Gap: 1 (L), BUN: 6 (L), Creatinine: 0.59 (L), Calcium: 7.3 (L), o/w WNL    0047    Troponin: <0.015      Emergency Department Course:    Reviewed:  I reviewed the patient's nursing notes, vitals, past medical history and care everywhere.     Assessments:  0038 I evaluated the patient.   0140 Patient was ambulated with a nurse, he was able to walk without difficulty or significant change in his oxygen saturation. Oxygen saturation of 95-96% on RA  0353 I rechecked the patient.      Interventions:  0115 Tylenol 1g PO    0116 Albuterol 2 puffs inhalation   0118 NS 1L IV     Disposition:  The patient was discharged to home.     Impression & Plan     Medical Decision Making:  Bahman Wolf is a 35 year old male who presents to the ED for evaluation of cough with known Covid-19 infection.  His primary concern is his cough.  Vital signs are reassuring, and the patient is not hypoxic. No extra work of breathing, normal mentation, and I do not believe that the patient requires hospitalization at this time. Ambulated with pulse oximetry and no hypoxia. Chest xray with evidence of pneumonia related to covid. Doubt PE with no hypoxia, no significant tachycardia. No evidence of myocarditis or ACS. Doubt bacterial infection. Do not think more advanced imaging is indicated. Patient will drink plenty of fluids, take anti-pyretics. Sent home with albuterol inhaler. Get well loop, care coordination referral for follow-up arrangement and pulse oximeter sent home with patient. Discussed reasons to return to ED.     Diagnosis:    ICD-10-CM    1. Pneumonia due to 2019 novel coronavirus  U07.1 COVID-19 GetWell Loop Referral    J12.89 CARE COORDINATION REFERRAL   2. Cough  R05        Discharge Medications:  New Prescriptions    ALBUTEROL (PROAIR HFA/PROVENTIL HFA/VENTOLIN HFA) 108 (90 BASE) MCG/ACT INHALER    Inhale 2 puffs into the lungs every 4 hours as needed for shortness of breath / dyspnea or wheezing     Scribe Disclosure:  Cynthia DILLARD, am serving as a scribe at 12:38 AM on 12/24/2020 to document services personally performed by Sandra Griffin MD based on my observations and the provider's statements to me.          Sandra Griffin MD  12/24/20 [id number]

## 2020-12-26 ENCOUNTER — HOSPITAL ENCOUNTER (INPATIENT)
Facility: CLINIC | Age: 35
LOS: 5 days | Discharge: HOME | End: 2020-12-31
Attending: EMERGENCY MEDICINE | Admitting: INTERNAL MEDICINE
Payer: COMMERCIAL

## 2020-12-26 ENCOUNTER — APPOINTMENT (OUTPATIENT)
Dept: GENERAL RADIOLOGY | Facility: CLINIC | Age: 35
End: 2020-12-26
Attending: EMERGENCY MEDICINE
Payer: COMMERCIAL

## 2020-12-26 DIAGNOSIS — U07.1 PNEUMONIA DUE TO 2019 NOVEL CORONAVIRUS: ICD-10-CM

## 2020-12-26 DIAGNOSIS — J12.82 PNEUMONIA DUE TO 2019 NOVEL CORONAVIRUS: ICD-10-CM

## 2020-12-26 DIAGNOSIS — J96.01 ACUTE RESPIRATORY FAILURE WITH HYPOXIA (H): ICD-10-CM

## 2020-12-26 LAB
ALBUMIN SERPL-MCNC: 2.9 G/DL (ref 3.4–5)
ALP SERPL-CCNC: 73 U/L (ref 40–150)
ALT SERPL W P-5'-P-CCNC: 121 U/L (ref 0–70)
ANION GAP SERPL CALCULATED.3IONS-SCNC: 4 MMOL/L (ref 3–14)
AST SERPL W P-5'-P-CCNC: 100 U/L (ref 0–45)
BASE EXCESS BLDV CALC-SCNC: 3.8 MMOL/L
BASOPHILS # BLD AUTO: 0 10E9/L (ref 0–0.2)
BASOPHILS NFR BLD AUTO: 0.2 %
BILIRUB SERPL-MCNC: 0.3 MG/DL (ref 0.2–1.3)
BUN SERPL-MCNC: 5 MG/DL (ref 7–30)
CALCIUM SERPL-MCNC: 7.6 MG/DL (ref 8.5–10.1)
CHLORIDE SERPL-SCNC: 104 MMOL/L (ref 94–109)
CO2 SERPL-SCNC: 29 MMOL/L (ref 20–32)
CREAT SERPL-MCNC: 0.57 MG/DL (ref 0.66–1.25)
CRP SERPL-MCNC: 93.5 MG/L (ref 0–8)
D DIMER PPP FEU-MCNC: 0.5 UG/ML FEU (ref 0–0.5)
DIFFERENTIAL METHOD BLD: NORMAL
EOSINOPHIL # BLD AUTO: 0 10E9/L (ref 0–0.7)
EOSINOPHIL NFR BLD AUTO: 0.2 %
ERYTHROCYTE [DISTWIDTH] IN BLOOD BY AUTOMATED COUNT: 12.1 % (ref 10–15)
FERRITIN SERPL-MCNC: 747 NG/ML (ref 26–388)
GFR SERPL CREATININE-BSD FRML MDRD: >90 ML/MIN/{1.73_M2}
GLUCOSE SERPL-MCNC: 141 MG/DL (ref 70–99)
HCO3 BLDV-SCNC: 30 MMOL/L (ref 21–28)
HCT VFR BLD AUTO: 41.2 % (ref 40–53)
HGB BLD-MCNC: 13.7 G/DL (ref 13.3–17.7)
IMM GRANULOCYTES # BLD: 0 10E9/L (ref 0–0.4)
IMM GRANULOCYTES NFR BLD: 0.2 %
INTERPRETATION ECG - MUSE: NORMAL
LACTATE BLD-SCNC: 1.1 MMOL/L (ref 0.7–2)
LDH SERPL L TO P-CCNC: 473 U/L (ref 85–227)
LYMPHOCYTES # BLD AUTO: 1.1 10E9/L (ref 0.8–5.3)
LYMPHOCYTES NFR BLD AUTO: 24.4 %
MAGNESIUM SERPL-MCNC: 1.9 MG/DL (ref 1.6–2.3)
MCH RBC QN AUTO: 26.9 PG (ref 26.5–33)
MCHC RBC AUTO-ENTMCNC: 33.3 G/DL (ref 31.5–36.5)
MCV RBC AUTO: 81 FL (ref 78–100)
MONOCYTES # BLD AUTO: 0.2 10E9/L (ref 0–1.3)
MONOCYTES NFR BLD AUTO: 3.4 %
NEUTROPHILS # BLD AUTO: 3.2 10E9/L (ref 1.6–8.3)
NEUTROPHILS NFR BLD AUTO: 71.6 %
NRBC # BLD AUTO: 0 10*3/UL
NRBC BLD AUTO-RTO: 0 /100
O2/TOTAL GAS SETTING VFR VENT: ABNORMAL %
PCO2 BLDV: 49 MM HG (ref 40–50)
PH BLDV: 7.39 PH (ref 7.32–7.43)
PLATELET # BLD AUTO: 222 10E9/L (ref 150–450)
PO2 BLDV: 31 MM HG (ref 25–47)
POTASSIUM SERPL-SCNC: 3.3 MMOL/L (ref 3.4–5.3)
PROCALCITONIN SERPL-MCNC: 0.07 NG/ML
PROT SERPL-MCNC: 7.2 G/DL (ref 6.8–8.8)
RBC # BLD AUTO: 5.09 10E12/L (ref 4.4–5.9)
SODIUM SERPL-SCNC: 137 MMOL/L (ref 133–144)
TROPONIN I SERPL-MCNC: <0.015 UG/L (ref 0–0.04)
WBC # BLD AUTO: 4.4 10E9/L (ref 4–11)

## 2020-12-26 PROCEDURE — 83615 LACTATE (LD) (LDH) ENZYME: CPT | Performed by: EMERGENCY MEDICINE

## 2020-12-26 PROCEDURE — 83735 ASSAY OF MAGNESIUM: CPT | Performed by: EMERGENCY MEDICINE

## 2020-12-26 PROCEDURE — 99222 1ST HOSP IP/OBS MODERATE 55: CPT | Mod: AI | Performed by: INTERNAL MEDICINE

## 2020-12-26 PROCEDURE — 80053 COMPREHEN METABOLIC PANEL: CPT | Performed by: EMERGENCY MEDICINE

## 2020-12-26 PROCEDURE — 86140 C-REACTIVE PROTEIN: CPT | Performed by: EMERGENCY MEDICINE

## 2020-12-26 PROCEDURE — C9803 HOPD COVID-19 SPEC COLLECT: HCPCS

## 2020-12-26 PROCEDURE — 120N000001 HC R&B MED SURG/OB

## 2020-12-26 PROCEDURE — 84145 PROCALCITONIN (PCT): CPT | Performed by: EMERGENCY MEDICINE

## 2020-12-26 PROCEDURE — 99285 EMERGENCY DEPT VISIT HI MDM: CPT | Mod: 25

## 2020-12-26 PROCEDURE — 71045 X-RAY EXAM CHEST 1 VIEW: CPT

## 2020-12-26 PROCEDURE — 87636 SARSCOV2 & INF A&B AMP PRB: CPT | Performed by: EMERGENCY MEDICINE

## 2020-12-26 PROCEDURE — 82728 ASSAY OF FERRITIN: CPT | Performed by: EMERGENCY MEDICINE

## 2020-12-26 PROCEDURE — 87040 BLOOD CULTURE FOR BACTERIA: CPT | Performed by: EMERGENCY MEDICINE

## 2020-12-26 PROCEDURE — 85379 FIBRIN DEGRADATION QUANT: CPT | Performed by: EMERGENCY MEDICINE

## 2020-12-26 PROCEDURE — 85025 COMPLETE CBC W/AUTO DIFF WBC: CPT | Performed by: EMERGENCY MEDICINE

## 2020-12-26 PROCEDURE — 96361 HYDRATE IV INFUSION ADD-ON: CPT

## 2020-12-26 PROCEDURE — 258N000003 HC RX IP 258 OP 636: Performed by: EMERGENCY MEDICINE

## 2020-12-26 PROCEDURE — 93005 ELECTROCARDIOGRAM TRACING: CPT

## 2020-12-26 PROCEDURE — 250N000011 HC RX IP 250 OP 636: Performed by: EMERGENCY MEDICINE

## 2020-12-26 PROCEDURE — 96374 THER/PROPH/DIAG INJ IV PUSH: CPT

## 2020-12-26 PROCEDURE — 83605 ASSAY OF LACTIC ACID: CPT | Performed by: EMERGENCY MEDICINE

## 2020-12-26 PROCEDURE — 82803 BLOOD GASES ANY COMBINATION: CPT | Performed by: EMERGENCY MEDICINE

## 2020-12-26 PROCEDURE — 84484 ASSAY OF TROPONIN QUANT: CPT | Performed by: EMERGENCY MEDICINE

## 2020-12-26 RX ORDER — GUAIFENESIN/DEXTROMETHORPHAN 100-10MG/5
5 SYRUP ORAL EVERY 4 HOURS PRN
Status: DISCONTINUED | OUTPATIENT
Start: 2020-12-26 | End: 2020-12-30

## 2020-12-26 RX ORDER — ONDANSETRON 4 MG/1
4 TABLET, ORALLY DISINTEGRATING ORAL EVERY 6 HOURS PRN
Status: DISCONTINUED | OUTPATIENT
Start: 2020-12-26 | End: 2020-12-31 | Stop reason: HOSPADM

## 2020-12-26 RX ORDER — ALBUTEROL SULFATE 90 UG/1
2 AEROSOL, METERED RESPIRATORY (INHALATION)
Status: DISCONTINUED | OUTPATIENT
Start: 2020-12-26 | End: 2020-12-31 | Stop reason: HOSPADM

## 2020-12-26 RX ORDER — ACETAMINOPHEN 325 MG/1
650 TABLET ORAL EVERY 4 HOURS PRN
Status: DISCONTINUED | OUTPATIENT
Start: 2020-12-26 | End: 2020-12-31 | Stop reason: HOSPADM

## 2020-12-26 RX ORDER — DEXAMETHASONE SODIUM PHOSPHATE 4 MG/ML
4 INJECTION, SOLUTION INTRA-ARTICULAR; INTRALESIONAL; INTRAMUSCULAR; INTRAVENOUS; SOFT TISSUE ONCE
Status: DISCONTINUED | OUTPATIENT
Start: 2020-12-26 | End: 2020-12-26

## 2020-12-26 RX ORDER — LIDOCAINE 40 MG/G
CREAM TOPICAL
Status: DISCONTINUED | OUTPATIENT
Start: 2020-12-26 | End: 2020-12-31 | Stop reason: HOSPADM

## 2020-12-26 RX ORDER — ONDANSETRON 2 MG/ML
4 INJECTION INTRAMUSCULAR; INTRAVENOUS EVERY 6 HOURS PRN
Status: DISCONTINUED | OUTPATIENT
Start: 2020-12-26 | End: 2020-12-31 | Stop reason: HOSPADM

## 2020-12-26 RX ORDER — ACETAMINOPHEN 650 MG/1
650 SUPPOSITORY RECTAL EVERY 4 HOURS PRN
Status: DISCONTINUED | OUTPATIENT
Start: 2020-12-26 | End: 2020-12-31 | Stop reason: HOSPADM

## 2020-12-26 RX ORDER — CODEINE PHOSPHATE AND GUAIFENESIN 10; 100 MG/5ML; MG/5ML
5 SOLUTION ORAL EVERY 4 HOURS PRN
Status: DISCONTINUED | OUTPATIENT
Start: 2020-12-26 | End: 2020-12-31 | Stop reason: HOSPADM

## 2020-12-26 RX ORDER — AMOXICILLIN 250 MG
1 CAPSULE ORAL 2 TIMES DAILY PRN
Status: DISCONTINUED | OUTPATIENT
Start: 2020-12-26 | End: 2020-12-31 | Stop reason: HOSPADM

## 2020-12-26 RX ORDER — AMOXICILLIN 250 MG
2 CAPSULE ORAL 2 TIMES DAILY PRN
Status: DISCONTINUED | OUTPATIENT
Start: 2020-12-26 | End: 2020-12-31 | Stop reason: HOSPADM

## 2020-12-26 RX ORDER — DEXAMETHASONE SODIUM PHOSPHATE 4 MG/ML
6 INJECTION, SOLUTION INTRA-ARTICULAR; INTRALESIONAL; INTRAMUSCULAR; INTRAVENOUS; SOFT TISSUE ONCE
Status: COMPLETED | OUTPATIENT
Start: 2020-12-26 | End: 2020-12-26

## 2020-12-26 RX ADMIN — DEXAMETHASONE SODIUM PHOSPHATE 6 MG: 4 INJECTION, SOLUTION INTRAMUSCULAR; INTRAVENOUS at 21:20

## 2020-12-26 RX ADMIN — SODIUM CHLORIDE, POTASSIUM CHLORIDE, SODIUM LACTATE AND CALCIUM CHLORIDE 500 ML: 600; 310; 30; 20 INJECTION, SOLUTION INTRAVENOUS at 21:21

## 2020-12-26 ASSESSMENT — MIFFLIN-ST. JEOR
SCORE: 2303.39
SCORE: 1902.41

## 2020-12-27 LAB
ANION GAP SERPL CALCULATED.3IONS-SCNC: 2 MMOL/L (ref 3–14)
BASOPHILS # BLD AUTO: 0 10E9/L (ref 0–0.2)
BASOPHILS NFR BLD AUTO: 0 %
BUN SERPL-MCNC: 6 MG/DL (ref 7–30)
CALCIUM SERPL-MCNC: 7.8 MG/DL (ref 8.5–10.1)
CHLORIDE SERPL-SCNC: 105 MMOL/L (ref 94–109)
CK SERPL-CCNC: 984 U/L (ref 30–300)
CO2 SERPL-SCNC: 31 MMOL/L (ref 20–32)
CREAT SERPL-MCNC: 0.54 MG/DL (ref 0.66–1.25)
CRP SERPL-MCNC: 105 MG/L (ref 0–8)
D DIMER PPP FEU-MCNC: 0.5 UG/ML FEU (ref 0–0.5)
DIFFERENTIAL METHOD BLD: ABNORMAL
EOSINOPHIL # BLD AUTO: 0 10E9/L (ref 0–0.7)
EOSINOPHIL NFR BLD AUTO: 0 %
ERYTHROCYTE [DISTWIDTH] IN BLOOD BY AUTOMATED COUNT: 12.2 % (ref 10–15)
FLUABV+SARS-COV-2+RSV PNL RESP NAA+PROBE: NEGATIVE
FLUABV+SARS-COV-2+RSV PNL RESP NAA+PROBE: NEGATIVE
GFR SERPL CREATININE-BSD FRML MDRD: >90 ML/MIN/{1.73_M2}
GLUCOSE SERPL-MCNC: 166 MG/DL (ref 70–99)
HCT VFR BLD AUTO: 42.2 % (ref 40–53)
HGB BLD-MCNC: 13.9 G/DL (ref 13.3–17.7)
LABORATORY COMMENT REPORT: ABNORMAL
LDH SERPL L TO P-CCNC: 496 U/L (ref 85–227)
LYMPHOCYTES # BLD AUTO: 0.4 10E9/L (ref 0.8–5.3)
LYMPHOCYTES NFR BLD AUTO: 17 %
MCH RBC QN AUTO: 26.9 PG (ref 26.5–33)
MCHC RBC AUTO-ENTMCNC: 32.9 G/DL (ref 31.5–36.5)
MCV RBC AUTO: 82 FL (ref 78–100)
MONOCYTES # BLD AUTO: 0.1 10E9/L (ref 0–1.3)
MONOCYTES NFR BLD AUTO: 3 %
NEUTROPHILS # BLD AUTO: 1.8 10E9/L (ref 1.6–8.3)
NEUTROPHILS NFR BLD AUTO: 80 %
PLATELET # BLD AUTO: 225 10E9/L (ref 150–450)
PLATELET # BLD EST: ABNORMAL 10*3/UL
POTASSIUM SERPL-SCNC: 3.8 MMOL/L (ref 3.4–5.3)
POTASSIUM SERPL-SCNC: 4.1 MMOL/L (ref 3.4–5.3)
RBC # BLD AUTO: 5.17 10E12/L (ref 4.4–5.9)
RBC MORPH BLD: ABNORMAL
RSV RNA SPEC QL NAA+PROBE: ABNORMAL
SARS-COV-2 RNA SPEC QL NAA+PROBE: POSITIVE
SODIUM SERPL-SCNC: 138 MMOL/L (ref 133–144)
SPECIMEN SOURCE: ABNORMAL
TROPONIN I SERPL-MCNC: <0.015 UG/L (ref 0–0.04)
WBC # BLD AUTO: 2.3 10E9/L (ref 4–11)

## 2020-12-27 PROCEDURE — 80048 BASIC METABOLIC PNL TOTAL CA: CPT | Performed by: INTERNAL MEDICINE

## 2020-12-27 PROCEDURE — 250N000009 HC RX 250: Performed by: INTERNAL MEDICINE

## 2020-12-27 PROCEDURE — 258N000003 HC RX IP 258 OP 636: Performed by: INTERNAL MEDICINE

## 2020-12-27 PROCEDURE — 85025 COMPLETE CBC W/AUTO DIFF WBC: CPT | Performed by: INTERNAL MEDICINE

## 2020-12-27 PROCEDURE — 84484 ASSAY OF TROPONIN QUANT: CPT | Performed by: INTERNAL MEDICINE

## 2020-12-27 PROCEDURE — 85379 FIBRIN DEGRADATION QUANT: CPT | Performed by: INTERNAL MEDICINE

## 2020-12-27 PROCEDURE — 99233 SBSQ HOSP IP/OBS HIGH 50: CPT | Performed by: INTERNAL MEDICINE

## 2020-12-27 PROCEDURE — 250N000011 HC RX IP 250 OP 636: Performed by: INTERNAL MEDICINE

## 2020-12-27 PROCEDURE — 84132 ASSAY OF SERUM POTASSIUM: CPT | Performed by: INTERNAL MEDICINE

## 2020-12-27 PROCEDURE — 86140 C-REACTIVE PROTEIN: CPT | Performed by: INTERNAL MEDICINE

## 2020-12-27 PROCEDURE — 250N000013 HC RX MED GY IP 250 OP 250 PS 637: Performed by: INTERNAL MEDICINE

## 2020-12-27 PROCEDURE — 82550 ASSAY OF CK (CPK): CPT | Performed by: INTERNAL MEDICINE

## 2020-12-27 PROCEDURE — 120N000001 HC R&B MED SURG/OB

## 2020-12-27 PROCEDURE — XW033E5 INTRODUCTION OF REMDESIVIR ANTI-INFECTIVE INTO PERIPHERAL VEIN, PERCUTANEOUS APPROACH, NEW TECHNOLOGY GROUP 5: ICD-10-PCS | Performed by: INTERNAL MEDICINE

## 2020-12-27 PROCEDURE — 250N000012 HC RX MED GY IP 250 OP 636 PS 637: Performed by: INTERNAL MEDICINE

## 2020-12-27 PROCEDURE — 36415 COLL VENOUS BLD VENIPUNCTURE: CPT | Performed by: INTERNAL MEDICINE

## 2020-12-27 PROCEDURE — 83615 LACTATE (LD) (LDH) ENZYME: CPT | Performed by: INTERNAL MEDICINE

## 2020-12-27 RX ADMIN — GUAIFENESIN AND CODEINE PHOSPHATE 5 ML: 100; 10 SOLUTION ORAL at 21:45

## 2020-12-27 RX ADMIN — IPRATROPIUM BROMIDE AND ALBUTEROL 2 PUFF: 20; 100 SPRAY, METERED RESPIRATORY (INHALATION) at 13:50

## 2020-12-27 RX ADMIN — IPRATROPIUM BROMIDE AND ALBUTEROL 2 PUFF: 20; 100 SPRAY, METERED RESPIRATORY (INHALATION) at 09:47

## 2020-12-27 RX ADMIN — GUAIFENESIN AND CODEINE PHOSPHATE 5 ML: 100; 10 SOLUTION ORAL at 04:51

## 2020-12-27 RX ADMIN — ACETAMINOPHEN 650 MG: 325 TABLET, FILM COATED ORAL at 13:41

## 2020-12-27 RX ADMIN — REMDESIVIR 200 MG: 100 INJECTION, POWDER, LYOPHILIZED, FOR SOLUTION INTRAVENOUS at 00:51

## 2020-12-27 RX ADMIN — ACETAMINOPHEN 650 MG: 325 TABLET, FILM COATED ORAL at 04:51

## 2020-12-27 RX ADMIN — GUAIFENESIN AND CODEINE PHOSPHATE 5 ML: 100; 10 SOLUTION ORAL at 00:22

## 2020-12-27 RX ADMIN — DEXAMETHASONE 6 MG: 2 TABLET ORAL at 09:43

## 2020-12-27 RX ADMIN — IPRATROPIUM BROMIDE AND ALBUTEROL 2 PUFF: 20; 100 SPRAY, METERED RESPIRATORY (INHALATION) at 21:46

## 2020-12-27 RX ADMIN — IPRATROPIUM BROMIDE AND ALBUTEROL 2 PUFF: 20; 100 SPRAY, METERED RESPIRATORY (INHALATION) at 00:53

## 2020-12-27 RX ADMIN — IPRATROPIUM BROMIDE AND ALBUTEROL 2 PUFF: 20; 100 SPRAY, METERED RESPIRATORY (INHALATION) at 17:37

## 2020-12-27 RX ADMIN — ACETAMINOPHEN 650 MG: 325 TABLET, FILM COATED ORAL at 17:31

## 2020-12-27 RX ADMIN — REMDESIVIR 100 MG: 100 INJECTION, POWDER, LYOPHILIZED, FOR SOLUTION INTRAVENOUS at 17:30

## 2020-12-27 RX ADMIN — GUAIFENESIN AND CODEINE PHOSPHATE 5 ML: 100; 10 SOLUTION ORAL at 13:41

## 2020-12-27 RX ADMIN — ACETAMINOPHEN 650 MG: 325 TABLET, FILM COATED ORAL at 09:43

## 2020-12-27 RX ADMIN — GUAIFENESIN AND CODEINE PHOSPHATE 5 ML: 100; 10 SOLUTION ORAL at 17:30

## 2020-12-27 RX ADMIN — ACETAMINOPHEN 650 MG: 325 TABLET, FILM COATED ORAL at 21:45

## 2020-12-27 RX ADMIN — ENOXAPARIN SODIUM 40 MG: 40 INJECTION SUBCUTANEOUS at 00:16

## 2020-12-27 RX ADMIN — ACETAMINOPHEN 650 MG: 325 TABLET, FILM COATED ORAL at 00:14

## 2020-12-27 RX ADMIN — ENOXAPARIN SODIUM 40 MG: 40 INJECTION SUBCUTANEOUS at 13:41

## 2020-12-27 RX ADMIN — GUAIFENESIN AND CODEINE PHOSPHATE 5 ML: 100; 10 SOLUTION ORAL at 09:42

## 2020-12-27 ASSESSMENT — ACTIVITIES OF DAILY LIVING (ADL)
ADLS_ACUITY_SCORE: 15
ADLS_ACUITY_SCORE: 17
ADLS_ACUITY_SCORE: 16
ADLS_ACUITY_SCORE: 15
ADLS_ACUITY_SCORE: 16
ADLS_ACUITY_SCORE: 16

## 2020-12-27 NOTE — PLAN OF CARE
Cognitive Concerns/ Orientation : Alert and oriented x 4   BEHAVIOR & AGGRESSION TOOL COLOR: Green  CIWA SCORE: N/A  ABNL VS/O2: VSS 91%-92% 1lpm NC   MOBILITY: Independent   PAIN MANAGMENT: Complains of chest pain with coughing  DIET: Regular   BOWEL/BLADDER: Continent   ABNL LAB/BG: Covid +  DRAIN/DEVICES: Peripheral IV SL Left AC   TELEMETRY RHYTHM: N/A   SKIN: Intact   TESTS/PROCEDURES: N/A   D/C DAY/GOALS/PLACE: Pending   OTHER IMPORTANT INFO: Covid precautions in place. Lung sounds diminished.  Infrequent dry non productive cough.

## 2020-12-27 NOTE — PROGRESS NOTES
RECEIVING UNIT ED HANDOFF REVIEW    ED Nurse Handoff Report was reviewed by: Renetta Canseco RN on December 26, 2020 at 11:21 PM

## 2020-12-27 NOTE — PROVIDER NOTIFICATION
Call out to hospitalist service to notify of positive Covid results. Patient in Covid precautions.

## 2020-12-27 NOTE — H&P
"[facility]    History and Physical  Hospitalist       Date of Admission:  12/26/2020  Date of Service (when I saw the patient): 12/26/20    Assessment & Plan   Bahman Wolf is a 35 year old male who presents with shortness of breath, cough, COVID-19 pneumonia    COVID-19 pneumonia  Diagnosed with COVID-19 12/17. Was in ED 12/24 with c/o cough. Was stable to be discharged but returns with ongoing issues with cough and cannot catch breath. O2 sats reportedly in 88-91% range in the ED. , CRP 93.5. ferritin 747, d-dimer 0.5. WBC normal. procal 0.07. AST/VTM376/121. Lactic 1.1. CXR with bilateral patchy opacities, consistent with COVID-19. EKG NSR  - blood cultures pending  - repeat COVID-19 pending (rapid)  - dexamethasone 6 mg daily  - remdesivir x 5 days  - lovenox ppx  - continuous O2 monitoring, supplemental O2  - prn antitussives  - prn albuterol inhaler    Hypokalemia  Replace per protocol    DVT Prophylaxis: Enoxaparin (Lovenox) SQ  Code Status: Full Code    Disposition: Expected discharge in 5 days once completes remdesivir or earlier if stable to discharge    Savage Banda MD  [phone] (P)  Text Page     Primary Care Physician   [facility]    Chief Complaint   Cough, sob    History is obtained from the patient and medical records    History of Present Illness   Bahman Wolf is a 35 year old male who presents with cough and shortness of breath.  He reports that he went to [facility] in [facility] 17 December secondary to fevers to get a Covid test.  He was called that this was positive.  On the 24th he was in the emergency department with complaints of cough.  At that time he was stable and was discharged home.  He returned to the emergency department today with complaints of severe cough and shortness of breath.  He also has had some headache.  He is also complaining of diffuse muscle aches in his stomach back, \"all muscles \".  He has had " some vomiting but no diarrhea.  He reports prior to presentation he had a fever to 105 degrees.    Past Medical History    I have reviewed this patient's medical history and updated it with pertinent information if needed.   Denies    Past Surgical History   I have reviewed this patient's surgical history and updated it with pertinent information if needed.  No past surgical history on file.    Prior to Admission Medications   Prior to Admission Medications   Prescriptions Last Dose Informant Patient Reported? Taking?   HYDROcodone-acetaminophen (NORCO) 5-325 MG per tablet   No No   Sig: Take 1-2 tablets by mouth every 4 hours as needed for moderate to severe pain   albuterol (PROAIR HFA/PROVENTIL HFA/VENTOLIN HFA) 108 (90 Base) MCG/ACT inhaler   No No   Sig: Inhale 2 puffs into the lungs every 4 hours as needed for shortness of breath / dyspnea or wheezing   dextromethorphan-guaiFENesin (MUCINEX DM)  MG per 12 hr tablet   Yes No   Sig: Take 1 tablet by mouth every 12 hours   ibuprofen (ADVIL,MOTRIN) 600 MG tablet   No No   Sig: Take 1 tablet (600 mg) by mouth every 6 hours as needed for moderate pain   oxyCODONE-acetaminophen (PERCOCET) 5-325 MG per tablet   No No   Sig: Take 1-2 tablets by mouth every 4 hours as needed for moderate to severe pain      Facility-Administered Medications: None     Allergies   No Known Allergies    Social History   I have reviewed this patient's social history and updated it with pertinent information if needed. Bahman Wolf  reports that he has quit smoking. He does not have any smokeless tobacco history on file. He reports that he does not drink alcohol.    Family History   I have reviewed this patient's family history and updated it with pertinent information if needed.   Denies relevant family history on admission    Review of Systems   The 10 point Review of Systems is negative other than noted in the HPI or here.     Physical Exam       BP: 117/72 Pulse: 88   Resp:  26 SpO2: 97 % O2 Device: None (Room air)    Vital Signs with Ranges  220 lbs 0 oz    Constitutional: alert, distress from cough and discomfort  Eyes: EOMI, PERRL  HEENT: OP clear  Respiratory: generally clear, occasional crackles  Cardiovascular: RRR without m/r/g  GI: soft, obese, nontedner  Lymph/Hematologic: no cervical LAD  Genitourinary: deferred  Skin: no rashes or lesions grossly  Musculoskeletal: no deformities or arthritis  Neurologic: CN II-XII, PAUL, sensation grossly intact  Psychiatric: mood anxious    Data   Data reviewed today:  I personally reviewed the EKG tracing showing NSR and the chest x-ray image(s) showing bilateral infiltrates.  Recent Labs   Lab 12/26/20 2118 12/24/20  0047   WBC 4.4 4.1   HGB 13.7 12.7*   MCV 81 84    190    140   POTASSIUM 3.3* 3.3*   CHLORIDE 104 108   CO2 29 31   BUN 5* 6*   CR 0.57* 0.59*   ANIONGAP 4 1*   EDWARD 7.6* 7.3*   * 142*   ALBUMIN 2.9*  --    PROTTOTAL 7.2  --    BILITOTAL 0.3  --    ALKPHOS 73  --    *  --    *  --    TROPI <0.015 <0.015       Recent Results (from the past 24 hour(s))   XR Chest Port 1 View    Narrative    CHEST ONE VIEW PORTABLE  12/26/2020 9:37 PM     HISTORY: Dyspnea/shortness of breath.    COMPARISON: Chest x-ray on 12/24/2020.      Impression    IMPRESSION: Portable AP view of the chest was obtained.  Cardiomediastinal silhouette is within normal limits. Multiple  bilateral patchy airspace pulmonary opacities, worrisome for infection  including atypical infection like COVID-19. No significant pleural  effusion or pneumothorax.    CAROLINA SOLANO MD

## 2020-12-27 NOTE — PROGRESS NOTES
"[facility]    Medicine Progress Note - Hospitalist Service        Date of Admission:  12/26/2020  8:33 PM    Assessment & Plan:   Bahman Wolf is a 35 year old male who presents with shortness of breath, cough, COVID-19 pneumonia     COVID-19 pneumonia with mild hypoxia  Diagnosed with COVID-19 12/17. Was in ED 12/24 with c/o cough. Was stable to be discharged but returns with ongoing issues with cough and dyspnea. O2 sats reportedly in 88-91% range in the ED.  -CXR with bilateral patchy opacities, consistent with COVID-19.   -continue dexamethasone 6 mg daily  -remdesivir x 5 days  -lovenox ppx  -continuous O2 monitoring, supplemental O2; currently on 1L  -prn antitussives  -prn albuterol inhaler  -monitor inflammatory markers     Hypokalemia  -Replace per protocol       Diet: Combination Diet Regular Diet Adult     DVT Prophylaxis: Enoxaparin (Lovenox) SQ   Edward Catheter: not present  Code Status: Full Code     Disposition Plan    Expected discharge: 4 - 7 days, recommended to prior living arrangement. Will transfer to [facility] today if bed available(pt agreeable)     Entered: Jacob Joshi MD 12/27/2020, 12:18 PM        The patient's care was discussed with the Bedside Nurse, Patient and pt's wife on the phone.    Jacob Joshi MD  Hospitalist Service  [facility]    ______________________________________________________________________    Interval History   Continue to have cough with associate CP. Dyspnea unchanged. O2 stable at 1L. Afebrile    Data reviewed today: I reviewed all medications, new labs and imaging results over the last 24 hours. I personally reviewed no images or EKG's today.    Physical Exam   Vital signs:  Temp: 98.5  F (36.9  C) Temp src: Oral BP: 135/80 Pulse: 84   Resp: 20 SpO2: 90 % O2 Device: Nasal cannula Oxygen Delivery: 1 LPM Height: 172.7 cm (5' 8\") Weight: 139.9 kg (308 lb 6.4 oz)  Estimated body mass index is 46.89 kg/m  as " "calculated from the following:    Height as of this encounter: 1.727 m (5' 8\").    Weight as of this encounter: 139.9 kg (308 lb 6.4 oz).      Wt Readings from Last 2 Encounters:   12/26/20 139.9 kg (308 lb 6.4 oz)   12/18/15 99.8 kg (220 lb)       Gen: AAOX3, NAD  HEENT: Supple neck, moist oral mucosa, no pallor  Resp:coarse BS b/l; normal WOB  CVS: RRR, no murmur  Abd/GI: Soft, non-tender. BS- normoactive.    Skin: Warm, dry no rashes  MSK: No joint deformities, no pedal edema  Neuro- CN- intact. No focal deficits.     Data   Recent Labs   Lab 12/27/20  0713 12/27/20 0042 12/26/20 2118 12/24/20 0047   WBC 2.3*  --  4.4 4.1   HGB 13.9  --  13.7 12.7*   MCV 82  --  81 84     --  222 190     --  137 140   POTASSIUM 4.1 3.8 3.3* 3.3*   CHLORIDE 105  --  104 108   CO2 31  --  29 31   BUN 6*  --  5* 6*   CR 0.54*  --  0.57* 0.59*   ANIONGAP 2*  --  4 1*   EDWARD 7.8*  --  7.6* 7.3*   *  --  141* 142*   ALBUMIN  --   --  2.9*  --    PROTTOTAL  --   --  7.2  --    BILITOTAL  --   --  0.3  --    ALKPHOS  --   --  73  --    ALT  --   --  121*  --    AST  --   --  100*  --    TROPI <0.015  --  <0.015 <0.015       Recent Results (from the past 24 hour(s))   XR Chest Port 1 View    Narrative    CHEST ONE VIEW PORTABLE  12/26/2020 9:37 PM     HISTORY: Dyspnea/shortness of breath.    COMPARISON: Chest x-ray on 12/24/2020.      Impression    IMPRESSION: Portable AP view of the chest was obtained.  Cardiomediastinal silhouette is within normal limits. Multiple  bilateral patchy airspace pulmonary opacities, worrisome for infection  including atypical infection like COVID-19. No significant pleural  effusion or pneumothorax.    CAROLINA SOLANO MD     Medications     - MEDICATION INSTRUCTIONS -         dexamethasone  6 mg Oral Daily     enoxaparin ANTICOAGULANT  40 mg Subcutaneous Q12H     ipratropium-albuterol  2 puff Inhalation 4x Daily     remdesivir  100 mg Intravenous Q24H     sodium chloride (PF)  3 mL " Intracatheter Q8H

## 2020-12-27 NOTE — PHARMACY-ADMISSION MEDICATION HISTORY
Pharmacy Medication History  Admission medication history interview status for the 12/26/2020  admission is complete. See EPIC admission navigator for prior to admission medications       Medication history sources: Patient  Location of interview: Phone  Adherence Assessment: Good    Significant changes made to the medication list:  Removed: Mucinex, Norco, ibuprofen      Additional medication history information:   None    Medication reconciliation completed by provider prior to medication history? Yes    Time spent in this activity: 15 mins      Prior to Admission medications    Medication Sig Last Dose Taking? Auth Provider   albuterol (PROAIR HFA/PROVENTIL HFA/VENTOLIN HFA) 108 (90 Base) MCG/ACT inhaler Inhale 2 puffs into the lungs every 4 hours as needed for shortness of breath / dyspnea or wheezing  at PRN Yes Sandra Griffin MD   oxyCODONE-acetaminophen (PERCOCET) 5-325 MG per tablet Take 1-2 tablets by mouth every 4 hours as needed for moderate to severe pain  at PRN Yes Yann Ojeda MD       The information provided in this note is only as accurate as the sources available at the time of the update(s).     Cristiana Gracia, RichieD

## 2020-12-27 NOTE — ED PROVIDER NOTES
"  History   Chief Complaint:  Cough       HPI   Bahman Wolf is a 35 year old male with history of tobacco use and a high BMI who presents with ongoing worsening symptoms of COVID-19 infection.  He was diagnosed with COVID-19 via testing at [facility] in [place] on December 18.  He was seen in the emergency department on December 24 and had a chest x-ray that showed findings consistent with Covid pneumonia.  He had an acceptable pulse oximetry and work of breathing and was discharged home.  He comes back today for concern of ongoing cough and fevers.  He feels short of breath.  He does not have a home pulse oximeter but his symptoms have been overwhelming to him and he has been feeling worse when he comes to the emergency department today.  He states that his last fever was earlier today and he reports that it was about 104.  He denies vomiting or diarrhea.  He denies a history of blood clot or cancer.  He is a smoker.    Review of Systems  As noted per HPI.  Remainder of a 10 point review of systems was negative.    Allergies:  The patient has no known allergies.     Medications:  The patient is not currently taking any prescribed medications.    Past Medical History:    Pupillary abnormality  Tobacco abuse  Covid-19    Past Surgical History:    Cataract removal    Social History:  Presents to the ED alone.    Physical Exam     Patient Vitals for the past 24 hrs:   BP Pulse Resp SpO2 Height Weight   12/26/20 2200 117/72 88 -- 97 % -- --   12/26/20 2100 117/72 93 -- 97 % -- --   12/26/20 2041 -- -- 26 -- -- --   12/26/20 2039 (!) 142/85 97 26 91 % 1.727 m (5' 8\") 99.8 kg (220 lb)       Physical Exam  General: Well-nourished, appears to be uncomfortable when I enter the room,  Eyes: PERRL, conjunctivae pink no scleral icterus or conjunctival injection  ENT:  Moist mucus membranes, posterior oropharynx clear  Respiratory: Saturating between 88 and 91% while I am in the room.  Tachypneic and coughing " vigorously and uncontrollably while I examined him.  Moaning.  Appears to be in pain.  CV: Normal rate, regular pulse  GI:  Abdomen soft and non-distended.  No tenderness, guarding or rebound  Skin: Warm, dry.  No rashes or petechiae  Musculoskeletal: No peripheral edema or calf tenderness  Neuro: Alert and oriented to person/place/time.  Neck supple.  Psychiatric: Anxious and fatigued affect    Emergency Department Course     ECG:  Indication: Chest Pain  Time: 2058  Vent. Rate 93 bpm. AZ interval 144. QRS duration 78. QT/QTc 342/425. P-R-T axis 11 -11 17. Normal sinus rhythm. Minimal voltage criteria for LVH, may be normal variant. Borderline ECG. Read time: 2105     Imaging:  XR Chest, Portable, G/E 1 view:   Portable AP view of the chest was obtained.  Cardiomediastinal silhouette is within normal limits. Multiple  bilateral patchy airspace pulmonary opacities, worrisome for infection  including atypical infection like COVID-19. No significant pleural  effusion or pneumothorax. As per radiology.    Laboratory:  CBC: WBC: 4.4, HGB: 13.7, PLT: 222    CMP: Glucose 141 (H), Potassium: 3.3 (L), Urea Nitrogen: 5 (L), Creatinine: 0.57 (L), Calcium: 7.6 (L), Albumin: 2.9 (L), ALT: 121 (H), AST: 100 (H), o/w WNL    VBG: pH: 7.39, PCO2: 49, PO2: 31, Bicarbonate: 30 (H), o/w WNL    2157 Troponin: <0.015    2201 Lactic Acid: 1.1    D-dimer: 0.5    CRP inflammation: 93.5 (H)    Magnesium: 1.9    Procalcitonin: 0.07    Ferritin: 747 (H)    Lactase Dehydrogenase: 473 (H)    Blood Culture: Pending    Symptomatic Influenza A/B antigen & COVID-19 PCR: Pending    Emergency Department Course:    Reviewed:  2036  I reviewed the patient's nursing notes, vitals, past medical records, Care Everywhere.     Assessments:  2040 Initial examination of the patient.     Consults:   2217 Spoke with Dr. Bnada, Hospitalist, who accept the patient.    Interventions:  2120 Decadron 6 mg IV  2121 NS 1L IV    Disposition:  The patient was  admitted to the hospital under the care of Dr. Banda.     Impression & Plan     Covid-19  Bahman Wolf was evaluated during a global COVID-19 pandemic, which necessitated consideration that the patient might be at risk for infection with the SARS-CoV-2 virus that causes COVID-19.   Applicable protocols for evaluation were followed during the patient's care.   COVID-19 was considered as part of the patient's evaluation. The plan for testing is:  a test was obtained during this visit.  a test was obtained at a previous visit and reviewed & considered today.    Medical Decision Making:  Bahman Wolf is a 35 year old male with history of smoking tobacco and obesity who comes today with worsening COVID-19 infection.  He seems to be about 8 to 10 days out. His pulse oximetry has worsened.  His chest x-ray is also consistent with worsening Covid pneumonia.  Given the worsening respiratory status and new hypoxia, we will admit to the hospital for oxygen therapy and start treatment with dexamethasone.  He will be started on remdesivir upon admission to the hospital.  At this time he does not require BiPAP or other advanced airway support.  I do not believe he has any complicating bacterial infection.  Dr. Banda graciously agreed to admit the patient.  The patient was agreed with the plan as well.    Diagnosis:    ICD-10-CM    1. Pneumonia due to 2019 novel coronavirus  U07.1 CBC with platelets differential    J12.89 Comprehensive metabolic panel     Magnesium     Lactic acid whole blood     Blood gas venous     Troponin I     Blood culture     Procalcitonin     CRP inflammation     Ferritin     Lactate Dehydrogenase     D dimer quantitative     Symptomatic Influenza A/B & SARS-CoV2 (COVID-19) Virus PCR Multiplex   2. Acute respiratory failure with hypoxia (H)  J96.01        Scribe Disclosure:  Piero DILLARD, am serving as a scribe at 8:38 PM on 12/26/2020 to document services personally performed by  Payton Mendez MD based on my observations and the provider's statements to me.          Payton Mendez MD  12/27/20 0146

## 2020-12-27 NOTE — ED NOTES
"[facility]  ED Nurse Handoff Report    ED Chief complaint: Cough      ED Diagnosis:   Final diagnoses:   Pneumonia due to 2019 novel coronavirus   Acute respiratory failure with hypoxia (H)       Code Status: Full Code    Allergies: No Known Allergies    Patient Story: Bahman Wolf is a 35 year old male who presents with cough. The patient was diagnosed with Covid-19 on 12/17. His worst symptom has been his cough and he states that he can't breath when he starts coughing.   Focused Assessment:  Pt. Is alert and orient. Times 3, has a persistant cough and CP with coughing. +SOB with exersion.    Treatments and/or interventions provided: LR times 500cc bolus, Decadron 6mg IVP.   Patient's response to treatments and/or interventions: Pt. Resting in room, coughing intermittantly.    To be done/followed up on inpatient unit:  Nothing    Does this patient have any cognitive concerns?: None    Activity level - Baseline/Home:  Independent  Activity Level - Current:   Independent    Patient's Preferred language: English   Needed?: No    Isolation: None and COVID r/o and special precautions  Infection: Not Applicable  COVID r/o and special precautions  Patient tested for COVID 19 prior to admission: YES  Bariatric?: No    Vital Signs:   Vitals:    12/26/20 2039 12/26/20 2041   BP: (!) 142/85    Pulse: 97    Resp: 26 26   SpO2: 91%    Weight: 99.8 kg (220 lb)    Height: 1.727 m (5' 8\")        Cardiac Rhythm:     Was the PSS-3 completed:   Yes  What interventions are required if any?               Family Comments: None present  OBS brochure/video discussed/provided to patient/family: Yes              Name of person given brochure if not patient:                Relationship to patient:      For the majority of the shift this patient's behavior was Green.   Behavioral interventions performed were None.    ED NURSE PHONE NUMBER: [phone]         "

## 2020-12-28 LAB
ANION GAP SERPL CALCULATED.3IONS-SCNC: 5 MMOL/L (ref 3–14)
BASOPHILS # BLD AUTO: 0 10E9/L (ref 0–0.2)
BASOPHILS NFR BLD AUTO: 0 %
BUN SERPL-MCNC: 12 MG/DL (ref 7–30)
CALCIUM SERPL-MCNC: 8.1 MG/DL (ref 8.5–10.1)
CHLORIDE SERPL-SCNC: 104 MMOL/L (ref 94–109)
CO2 SERPL-SCNC: 28 MMOL/L (ref 20–32)
CREAT SERPL-MCNC: 0.48 MG/DL (ref 0.66–1.25)
CRP SERPL-MCNC: 57.8 MG/L (ref 0–8)
D DIMER PPP FEU-MCNC: 0.5 UG/ML FEU (ref 0–0.5)
DIFFERENTIAL METHOD BLD: NORMAL
EOSINOPHIL # BLD AUTO: 0 10E9/L (ref 0–0.7)
EOSINOPHIL NFR BLD AUTO: 0 %
ERYTHROCYTE [DISTWIDTH] IN BLOOD BY AUTOMATED COUNT: 12 % (ref 10–15)
GFR SERPL CREATININE-BSD FRML MDRD: >90 ML/MIN/{1.73_M2}
GLUCOSE SERPL-MCNC: 148 MG/DL (ref 70–99)
HCT VFR BLD AUTO: 41.8 % (ref 40–53)
HGB BLD-MCNC: 13.9 G/DL (ref 13.3–17.7)
IMM GRANULOCYTES # BLD: 0 10E9/L (ref 0–0.4)
IMM GRANULOCYTES NFR BLD: 0.2 %
LYMPHOCYTES # BLD AUTO: 1.3 10E9/L (ref 0.8–5.3)
LYMPHOCYTES NFR BLD AUTO: 30.9 %
MCH RBC QN AUTO: 26.7 PG (ref 26.5–33)
MCHC RBC AUTO-ENTMCNC: 33.3 G/DL (ref 31.5–36.5)
MCV RBC AUTO: 80 FL (ref 78–100)
MONOCYTES # BLD AUTO: 0.4 10E9/L (ref 0–1.3)
MONOCYTES NFR BLD AUTO: 9 %
NEUTROPHILS # BLD AUTO: 2.5 10E9/L (ref 1.6–8.3)
NEUTROPHILS NFR BLD AUTO: 59.9 %
NRBC # BLD AUTO: 0 10*3/UL
NRBC BLD AUTO-RTO: 0 /100
PLATELET # BLD AUTO: 309 10E9/L (ref 150–450)
PLATELET # BLD EST: NORMAL 10*3/UL
POTASSIUM SERPL-SCNC: 3.7 MMOL/L (ref 3.4–5.3)
RBC # BLD AUTO: 5.2 10E12/L (ref 4.4–5.9)
RBC MORPH BLD: NORMAL
SODIUM SERPL-SCNC: 137 MMOL/L (ref 133–144)
WBC # BLD AUTO: 4.1 10E9/L (ref 4–11)

## 2020-12-28 PROCEDURE — 86140 C-REACTIVE PROTEIN: CPT | Performed by: INTERNAL MEDICINE

## 2020-12-28 PROCEDURE — 36415 COLL VENOUS BLD VENIPUNCTURE: CPT | Performed by: INTERNAL MEDICINE

## 2020-12-28 PROCEDURE — 120N000001 HC R&B MED SURG/OB

## 2020-12-28 PROCEDURE — 250N000013 HC RX MED GY IP 250 OP 250 PS 637: Performed by: INTERNAL MEDICINE

## 2020-12-28 PROCEDURE — 250N000011 HC RX IP 250 OP 636: Performed by: INTERNAL MEDICINE

## 2020-12-28 PROCEDURE — 80048 BASIC METABOLIC PNL TOTAL CA: CPT | Performed by: INTERNAL MEDICINE

## 2020-12-28 PROCEDURE — 258N000003 HC RX IP 258 OP 636: Performed by: INTERNAL MEDICINE

## 2020-12-28 PROCEDURE — 85379 FIBRIN DEGRADATION QUANT: CPT | Performed by: INTERNAL MEDICINE

## 2020-12-28 PROCEDURE — 250N000012 HC RX MED GY IP 250 OP 636 PS 637: Performed by: INTERNAL MEDICINE

## 2020-12-28 PROCEDURE — 250N000009 HC RX 250: Performed by: INTERNAL MEDICINE

## 2020-12-28 PROCEDURE — 99232 SBSQ HOSP IP/OBS MODERATE 35: CPT | Performed by: HOSPITALIST

## 2020-12-28 PROCEDURE — 85025 COMPLETE CBC W/AUTO DIFF WBC: CPT | Performed by: INTERNAL MEDICINE

## 2020-12-28 RX ADMIN — IPRATROPIUM BROMIDE AND ALBUTEROL 2 PUFF: 20; 100 SPRAY, METERED RESPIRATORY (INHALATION) at 17:42

## 2020-12-28 RX ADMIN — ENOXAPARIN SODIUM 40 MG: 40 INJECTION SUBCUTANEOUS at 01:36

## 2020-12-28 RX ADMIN — GUAIFENESIN AND DEXTROMETHORPHAN 5 ML: 100; 10 SYRUP ORAL at 17:59

## 2020-12-28 RX ADMIN — GUAIFENESIN AND CODEINE PHOSPHATE 5 ML: 100; 10 SOLUTION ORAL at 01:36

## 2020-12-28 RX ADMIN — ACETAMINOPHEN 650 MG: 325 TABLET, FILM COATED ORAL at 05:56

## 2020-12-28 RX ADMIN — IPRATROPIUM BROMIDE AND ALBUTEROL 2 PUFF: 20; 100 SPRAY, METERED RESPIRATORY (INHALATION) at 12:39

## 2020-12-28 RX ADMIN — ACETAMINOPHEN 650 MG: 325 TABLET, FILM COATED ORAL at 01:36

## 2020-12-28 RX ADMIN — GUAIFENESIN AND CODEINE PHOSPHATE 5 ML: 100; 10 SOLUTION ORAL at 14:06

## 2020-12-28 RX ADMIN — ENOXAPARIN SODIUM 40 MG: 40 INJECTION SUBCUTANEOUS at 23:41

## 2020-12-28 RX ADMIN — GUAIFENESIN AND CODEINE PHOSPHATE 5 ML: 100; 10 SOLUTION ORAL at 22:15

## 2020-12-28 RX ADMIN — ENOXAPARIN SODIUM 40 MG: 40 INJECTION SUBCUTANEOUS at 12:39

## 2020-12-28 RX ADMIN — GUAIFENESIN AND CODEINE PHOSPHATE 5 ML: 100; 10 SOLUTION ORAL at 05:56

## 2020-12-28 RX ADMIN — IPRATROPIUM BROMIDE AND ALBUTEROL 2 PUFF: 20; 100 SPRAY, METERED RESPIRATORY (INHALATION) at 10:35

## 2020-12-28 RX ADMIN — IPRATROPIUM BROMIDE AND ALBUTEROL 2 PUFF: 20; 100 SPRAY, METERED RESPIRATORY (INHALATION) at 22:16

## 2020-12-28 RX ADMIN — DEXAMETHASONE 6 MG: 2 TABLET ORAL at 08:39

## 2020-12-28 RX ADMIN — REMDESIVIR 100 MG: 100 INJECTION, POWDER, LYOPHILIZED, FOR SOLUTION INTRAVENOUS at 15:57

## 2020-12-28 ASSESSMENT — ACTIVITIES OF DAILY LIVING (ADL)
ADLS_ACUITY_SCORE: 15

## 2020-12-28 NOTE — PLAN OF CARE
Cognitive Concerns/ Orientation : Alert and oriented x 4   BEHAVIOR & AGGRESSION TOOL COLOR: Green  CIWA SCORE: N/A  ABNL VS/O2: VSS 91%-92% 2 L O2 NC   MOBILITY: Independent in room  PAIN MANAGMENT: Complains of chest pain with coughing-tylenol given every 4 hours  DIET: Regular-poor appetite, drinking good, trying to eat small amounts  BOWEL/BLADDER: Continent   ABNL LAB/BG: Covid +, CRP elevated.  DRAIN/DEVICES: Peripheral IV SL Left AC   TELEMETRY RHYTHM: N/A   SKIN: Intact   TESTS/PROCEDURES: N/A   D/C DAY/GOALS/PLACE: Pending   OTHER IMPORTANT INFO: Covid precautions in place. Lung sounds diminished.  Infrequent dry non productive cough-robitussin with codeine given.

## 2020-12-28 NOTE — PROGRESS NOTES
Clinic Care Coordination Contact    Per additional chart review on 12/28/20, ambulatory care coordination referral placed on 12/24/20 was desired by ED provider to follow COVID-19 home virtual monitoring program outreach.  RN Care Coordinator made outreach attempt as detailed below on 12/24/20.  Per chart review this morning, patient returned to ED on 12/26/20 and was admitted to [facility].     Plan: Ambulatory care coordination referral will be cancelled due to above- patient is currently admitted as inpatient.    Neyda Warren, REMBERTON, RN   [facility]  - Clinic Care Coordinator

## 2020-12-28 NOTE — PROGRESS NOTES
[facility]    Medicine Progress Note - Hospitalist Service       Date of Admission:  12/26/2020  Assessment & Plan       Bahman Wolf is a 35 year old male who presents with shortness of breath, cough, and COVID-19 pneumonia     COVID-19 pneumonia with mild hypoxia  Diagnosed with COVID-19 12/17. Was in ED 12/24 with c/o cough. Was stable to be discharged but returns with ongoing issues with cough and dyspnea. O2 sats reportedly in 88-91% range in the ED.  - CXR with bilateral patchy opacities, consistent with COVID-19.   - continue dexamethasone 6 mg daily  - remdesivir x 5 days  - lovenox for VTE px  - continuous O2 monitoring, supplemental O2; currently on 1-2L  - encourage IS  - prn antitussives  - prn albuterol inhaler  - monitor inflammatory markers - CRP improving, D dimer steady at 0.5 since adm     Hypokalemia  - Replace per protocol      Diet: Combination Diet Regular Diet Adult    DVT Prophylaxis: Enoxaparin (Lovenox) SQ  Edward Catheter: not present  Code Status: Full Code           Disposition Plan   Expected discharge: pending improvement, O2 needs, and likely completion of remdesivir  Entered: Parker Navarro MD 12/28/2020, 3:21 PM       The patient's care was discussed with the Bedside Nurse and Patient.    Parker Navarro MD  Hospitalist Service  [facility]  Contact information available via [facility] Paging/Directory    ______________________________________________________________________    Interval History   Chart reviewed. Care assumed today. Patient seen and examined earlier today. No new complaints, occasional cough, some discomfort with cough, no fevers or chills. SOB continued, on 1-2L nasal cannula. Decadron and remdesivir continued.    Data reviewed today: I reviewed all medications, new labs and imaging results over the last 24 hours. I personally reviewed no images or EKG's today.    Physical Exam   Vital Signs: Temp: 98.3  F  (36.8  C) Temp src: Oral BP: 109/70 Pulse: 77   Resp: 22 SpO2: 90 % O2 Device: None (Room air) Oxygen Delivery: 2 LPM  Weight: 308 lbs 6.4 oz    Gen: NAD, pleasant  HEENT: Normocephalic, EOMI, MMM  Resp: no focal crackles,  no wheezes, no increased work of resp  CV: S1S2 heard, reg rhythm, reg rate  Abdo: soft, nontender, nondistended, bowel sounds present  Ext: calves nontender, well perfused  Neuro: AAOx3, CN grossly intact, no facial asymmetry      Data   Recent Labs   Lab 12/28/20  0712 12/27/20  0713 12/27/20 0042 12/26/20 2118 12/24/20 0047   WBC 4.1 2.3*  --  4.4 4.1   HGB 13.9 13.9  --  13.7 12.7*   MCV 80 82  --  81 84    225  --  222 190    138  --  137 140   POTASSIUM 3.7 4.1 3.8 3.3* 3.3*   CHLORIDE 104 105  --  104 108   CO2 28 31  --  29 31   BUN 12 6*  --  5* 6*   CR 0.48* 0.54*  --  0.57* 0.59*   ANIONGAP 5 2*  --  4 1*   EDWARD 8.1* 7.8*  --  7.6* 7.3*   * 166*  --  141* 142*   ALBUMIN  --   --   --  2.9*  --    PROTTOTAL  --   --   --  7.2  --    BILITOTAL  --   --   --  0.3  --    ALKPHOS  --   --   --  73  --    ALT  --   --   --  121*  --    AST  --   --   --  100*  --    TROPI  --  <0.015  --  <0.015 <0.015     No results found for this or any previous visit (from the past 24 hour(s)).

## 2020-12-28 NOTE — PROGRESS NOTES
Received a call from the operation center (Bambi) stating that we request a bed at [facility] unit  and she was calling to say they still do not have beds she will call us back when they have a bed.

## 2020-12-28 NOTE — PLAN OF CARE
DATE & TIME: 12/28/2020 4075-2667    Cognitive Concerns/ Orientation : A&OX4   BEHAVIOR & AGGRESSION TOOL COLOR: Green  CIWA SCORE: NA  ABNL VS/O2: Attempted to wean O2.  Desats to 87%.  On 2L at present.  O2 sats 92-94%.  LS diminished.  Productive cough  MOBILITY: Ind  PAIN MANAGMENT: C/O rib pain when coughing.  Declines meds at this time  DIET: Regular  BOWEL/BLADDER: Cont  ABNL LAB/BG: CRP trending down  DRAIN/DEVICES: IV SL  TELEMETRY RHYTHM: NA  SKIN: Intact  Trace Edema ankles  TESTS/PROCEDURES: NA  D/C DAY/GOALS/PLACE: Possibly tomorrow pending progress  OTHER IMPORTANT INFO: IS teaching done.  Encourage use.  Robitussin given X1  MD/RN ROUNDING SIGNED OFF D/E SHIFT: Y  COMMIT TO SIT DONE AND SIGNED OFF Y

## 2020-12-28 NOTE — PLAN OF CARE
Cognitive Concerns/ Orientation : Alert and oriented x 4   BEHAVIOR & AGGRESSION TOOL COLOR: Green  CIWA SCORE: N/A  ABNL VS/O2: VSS 91%-92% 2 lpm NC   MOBILITY: Independent   PAIN MANAGMENT: Complains of chest pain with coughing, receives Tylenol and Robitussin AC every 4 hours PRN   DIET: Regular   BOWEL/BLADDER: Continent   ABNL LAB/BG: Covid +  DRAIN/DEVICES: Peripheral IV SL Left AC   TELEMETRY RHYTHM: N/A   SKIN: Intact   TESTS/PROCEDURES: N/A   D/C DAY/GOALS/PLACE: Pending   OTHER IMPORTANT INFO: Covid precautions in place. Lung sounds diminished.  frequent dry non productive cough.

## 2020-12-29 LAB
ANION GAP SERPL CALCULATED.3IONS-SCNC: 7 MMOL/L (ref 3–14)
BASOPHILS # BLD AUTO: 0 10E9/L (ref 0–0.2)
BASOPHILS NFR BLD AUTO: 0.2 %
BUN SERPL-MCNC: 16 MG/DL (ref 7–30)
CALCIUM SERPL-MCNC: 8.2 MG/DL (ref 8.5–10.1)
CHLORIDE SERPL-SCNC: 105 MMOL/L (ref 94–109)
CO2 SERPL-SCNC: 29 MMOL/L (ref 20–32)
CREAT SERPL-MCNC: 0.54 MG/DL (ref 0.66–1.25)
CRP SERPL-MCNC: 20.7 MG/L (ref 0–8)
D DIMER PPP FEU-MCNC: 0.4 UG/ML FEU (ref 0–0.5)
DIFFERENTIAL METHOD BLD: NORMAL
EOSINOPHIL # BLD AUTO: 0 10E9/L (ref 0–0.7)
EOSINOPHIL NFR BLD AUTO: 0 %
ERYTHROCYTE [DISTWIDTH] IN BLOOD BY AUTOMATED COUNT: 11.9 % (ref 10–15)
GFR SERPL CREATININE-BSD FRML MDRD: >90 ML/MIN/{1.73_M2}
GLUCOSE SERPL-MCNC: 124 MG/DL (ref 70–99)
HCT VFR BLD AUTO: 44.7 % (ref 40–53)
HGB BLD-MCNC: 14.7 G/DL (ref 13.3–17.7)
IMM GRANULOCYTES # BLD: 0 10E9/L (ref 0–0.4)
IMM GRANULOCYTES NFR BLD: 0.2 %
LYMPHOCYTES # BLD AUTO: 2.4 10E9/L (ref 0.8–5.3)
LYMPHOCYTES NFR BLD AUTO: 44.7 %
MCH RBC QN AUTO: 26.5 PG (ref 26.5–33)
MCHC RBC AUTO-ENTMCNC: 32.9 G/DL (ref 31.5–36.5)
MCV RBC AUTO: 81 FL (ref 78–100)
MONOCYTES # BLD AUTO: 0.6 10E9/L (ref 0–1.3)
MONOCYTES NFR BLD AUTO: 11.9 %
NEUTROPHILS # BLD AUTO: 2.3 10E9/L (ref 1.6–8.3)
NEUTROPHILS NFR BLD AUTO: 43 %
NRBC # BLD AUTO: 0 10*3/UL
NRBC BLD AUTO-RTO: 0 /100
PLATELET # BLD AUTO: 403 10E9/L (ref 150–450)
PLATELET # BLD EST: NORMAL 10*3/UL
POTASSIUM SERPL-SCNC: 3.5 MMOL/L (ref 3.4–5.3)
RBC # BLD AUTO: 5.54 10E12/L (ref 4.4–5.9)
RBC MORPH BLD: NORMAL
SMUDGE CELLS BLD QL SMEAR: PRESENT
SODIUM SERPL-SCNC: 141 MMOL/L (ref 133–144)
VARIANT LYMPHS BLD QL SMEAR: PRESENT
WBC # BLD AUTO: 5.3 10E9/L (ref 4–11)

## 2020-12-29 PROCEDURE — 80048 BASIC METABOLIC PNL TOTAL CA: CPT | Performed by: HOSPITALIST

## 2020-12-29 PROCEDURE — 258N000003 HC RX IP 258 OP 636: Performed by: INTERNAL MEDICINE

## 2020-12-29 PROCEDURE — 250N000009 HC RX 250: Performed by: INTERNAL MEDICINE

## 2020-12-29 PROCEDURE — 99232 SBSQ HOSP IP/OBS MODERATE 35: CPT | Performed by: HOSPITALIST

## 2020-12-29 PROCEDURE — 86140 C-REACTIVE PROTEIN: CPT | Performed by: HOSPITALIST

## 2020-12-29 PROCEDURE — 250N000013 HC RX MED GY IP 250 OP 250 PS 637: Performed by: INTERNAL MEDICINE

## 2020-12-29 PROCEDURE — 250N000012 HC RX MED GY IP 250 OP 636 PS 637: Performed by: INTERNAL MEDICINE

## 2020-12-29 PROCEDURE — 85379 FIBRIN DEGRADATION QUANT: CPT | Performed by: HOSPITALIST

## 2020-12-29 PROCEDURE — 36415 COLL VENOUS BLD VENIPUNCTURE: CPT | Performed by: HOSPITALIST

## 2020-12-29 PROCEDURE — 120N000001 HC R&B MED SURG/OB

## 2020-12-29 PROCEDURE — 85025 COMPLETE CBC W/AUTO DIFF WBC: CPT | Performed by: HOSPITALIST

## 2020-12-29 PROCEDURE — 250N000011 HC RX IP 250 OP 636: Performed by: INTERNAL MEDICINE

## 2020-12-29 RX ORDER — CODEINE PHOSPHATE AND GUAIFENESIN 10; 100 MG/5ML; MG/5ML
5 SOLUTION ORAL EVERY 4 HOURS PRN
Start: 2020-12-29 | End: 2020-12-29

## 2020-12-29 RX ORDER — DEXAMETHASONE 6 MG/1
6 TABLET ORAL DAILY
Start: 2020-12-29 | End: 2020-12-29

## 2020-12-29 RX ADMIN — GUAIFENESIN AND CODEINE PHOSPHATE 5 ML: 100; 10 SOLUTION ORAL at 09:10

## 2020-12-29 RX ADMIN — IPRATROPIUM BROMIDE AND ALBUTEROL 2 PUFF: 20; 100 SPRAY, METERED RESPIRATORY (INHALATION) at 09:12

## 2020-12-29 RX ADMIN — ENOXAPARIN SODIUM 40 MG: 40 INJECTION SUBCUTANEOUS at 13:01

## 2020-12-29 RX ADMIN — GUAIFENESIN AND CODEINE PHOSPHATE 5 ML: 100; 10 SOLUTION ORAL at 13:01

## 2020-12-29 RX ADMIN — IPRATROPIUM BROMIDE AND ALBUTEROL 2 PUFF: 20; 100 SPRAY, METERED RESPIRATORY (INHALATION) at 18:13

## 2020-12-29 RX ADMIN — IPRATROPIUM BROMIDE AND ALBUTEROL 2 PUFF: 20; 100 SPRAY, METERED RESPIRATORY (INHALATION) at 13:01

## 2020-12-29 RX ADMIN — GUAIFENESIN AND CODEINE PHOSPHATE 5 ML: 100; 10 SOLUTION ORAL at 03:55

## 2020-12-29 RX ADMIN — DEXAMETHASONE 6 MG: 2 TABLET ORAL at 09:10

## 2020-12-29 RX ADMIN — REMDESIVIR 100 MG: 100 INJECTION, POWDER, LYOPHILIZED, FOR SOLUTION INTRAVENOUS at 18:11

## 2020-12-29 ASSESSMENT — ACTIVITIES OF DAILY LIVING (ADL)
ADLS_ACUITY_SCORE: 15

## 2020-12-29 NOTE — PLAN OF CARE
[id number]: Alert and oriented x4. VSS on 2L. Up independently in room. Pain only with coughing robitussin given x1. Plan to transfer to [facility] possibly tonight.       Patient will transfer to [facility] unit 5000 tomorrow morning   Please call and give report to [phone]   [facility] will be here sat 1130am

## 2020-12-29 NOTE — PROGRESS NOTES
[facility]    Medicine Progress Note - Hospitalist Service       Date of Admission:  12/26/2020  Assessment & Plan       Bahman Wolf is a 35 year old male who presents with shortness of breath, cough, and COVID-19 pneumonia     COVID-19 pneumonia with mild hypoxia  Diagnosed with COVID-19 12/17. Was in ED 12/24 with c/o cough. Was stable to be discharged but returns with ongoing issues with cough and dyspnea. O2 sats reportedly in 88-91% range in the ED.  - CXR with bilateral patchy opacities, consistent with COVID-19.   - continue dexamethasone 6 mg daily (started 12/26)  - remdesivir x 5 days (has received 3 doses as of 12/29 AM)  - lovenox for VTE px (40 bid given)  - continuous O2 monitoring, supplemental O2; continues on 2L   - encourage IS  - prn antitussives  - prn combivent inhaler  - monitor inflammatory markers - CRP improving as of 12/29:  20.7 <-- 56 <-- 100, D dimer 0.4 from 0.5 previously     Hypokalemia  - Replace per protocol     Morbid obesity  BMI 46. Lifestyle modifications and further management with PCP upon discharge.          Diet: Combination Diet Regular Diet Adult    DVT Prophylaxis: Enoxaparin (Lovenox) SQ  Edward Catheter: not present  Code Status: Full Code           Disposition Plan   Expected discharge: 2 - 3 days, recommended to prior living arrangement once clinically improved and hopefully off of oxygen.  Entered: Parker Navarro MD 12/29/2020, 10:55 AM       The patient's care was discussed with the Bedside Nurse, Care Coordinator/ and Patient.    Parker Navarro MD  Hospitalist Service  [facility]  Contact information available via [facility] Paging/Directory    ______________________________________________________________________    Interval History   No new issues or complaints. Was essentially all set to transfer to [facility] which he previously agreed to but now he does not want to go there, so we will  continue to provide care here at [facility]. Still some discomfort with coughing. No new pain, fevers, chills, or worsening sob. Continued on 2L NC so far today. Remdes, decadron, lovenox continued.    Data reviewed today: I reviewed all medications, new labs and imaging results over the last 24 hours. I personally reviewed no images or EKG's today.    Physical Exam   Vital Signs: Temp: 98.2  F (36.8  C) Temp src: Oral BP: 129/83 Pulse: 80   Resp: 28 SpO2: 96 % O2 Device: Nasal cannula Oxygen Delivery: 2 LPM  Weight: 308 lbs 6.4 oz    Gen: NAD, pleasant  HEENT: Normocephalic, EOMI, MMM  Resp: no focal crackles,  no wheezes, no increased work of resp  CV: S1S2 heard, reg rhythm, reg rate, no pitting pedal edema  Abdo: soft, nontender, nondistended, bowel sounds present  Ext: calves nontender, well perfused  Neuro: AAOx3, CN grossly intact, no facial asymmetry      Data   Recent Labs   Lab 12/29/20  0900 12/28/20  0712 12/27/20  0713 12/26/20  2118 12/26/20 2118 12/24/20  0047   WBC 5.3 4.1 2.3*  --  4.4 4.1   HGB 14.7 13.9 13.9  --  13.7 12.7*   MCV 81 80 82  --  81 84    309 225  --  222 190    137 138  --  137 140   POTASSIUM 3.5 3.7 4.1   < > 3.3* 3.3*   CHLORIDE 105 104 105  --  104 108   CO2 29 28 31  --  29 31   BUN 16 12 6*  --  5* 6*   CR 0.54* 0.48* 0.54*  --  0.57* 0.59*   ANIONGAP 7 5 2*  --  4 1*   EDWARD 8.2* 8.1* 7.8*  --  7.6* 7.3*   * 148* 166*  --  141* 142*   ALBUMIN  --   --   --   --  2.9*  --    PROTTOTAL  --   --   --   --  7.2  --    BILITOTAL  --   --   --   --  0.3  --    ALKPHOS  --   --   --   --  73  --    ALT  --   --   --   --  121*  --    AST  --   --   --   --  100*  --    TROPI  --   --  <0.015  --  <0.015 <0.015    < > = values in this interval not displayed.     No results found for this or any previous visit (from the past 24 hour(s)).

## 2020-12-29 NOTE — PLAN OF CARE
Summary:     DATE & TIME: 12-29-20 Day shift  Cognitive Concerns/ Orientation : Alert and oriented x 4, pleasant  BEHAVIOR & AGGRESSION TOOL COLOR: Green  CIWA SCORE: N/A  ABNL VS/O2: VSS  on RA 93%-97% able to wean off O2  MOBILITY: Independent   PAIN MANAGMENT: Complains of pain with coughing, received Robitussin with codeine q4h PRN x2  DIET: Regular   BOWEL/BLADDER: Continent   ABNL LAB/BG: Covid +  DRAIN/DEVICES: Peripheral IV SL Left AC   TELEMETRY RHYTHM: N/A   SKIN: Intact   TESTS/PROCEDURES: N/A   D/C DAY/GOALS/PLACE: Refused transfer to [facility]. Will stay here until medically stable.   OTHER IMPORTANT INFO: Covid precautions in place. Lung sounds diminished.  Frequent dry productive cough.

## 2020-12-30 PROCEDURE — 258N000003 HC RX IP 258 OP 636: Performed by: INTERNAL MEDICINE

## 2020-12-30 PROCEDURE — 250N000012 HC RX MED GY IP 250 OP 636 PS 637: Performed by: INTERNAL MEDICINE

## 2020-12-30 PROCEDURE — 250N000011 HC RX IP 250 OP 636: Performed by: INTERNAL MEDICINE

## 2020-12-30 PROCEDURE — 250N000013 HC RX MED GY IP 250 OP 250 PS 637: Performed by: INTERNAL MEDICINE

## 2020-12-30 PROCEDURE — 250N000009 HC RX 250: Performed by: INTERNAL MEDICINE

## 2020-12-30 PROCEDURE — 99232 SBSQ HOSP IP/OBS MODERATE 35: CPT | Performed by: HOSPITALIST

## 2020-12-30 PROCEDURE — 120N000001 HC R&B MED SURG/OB

## 2020-12-30 RX ADMIN — IPRATROPIUM BROMIDE AND ALBUTEROL 2 PUFF: 20; 100 SPRAY, METERED RESPIRATORY (INHALATION) at 00:04

## 2020-12-30 RX ADMIN — IPRATROPIUM BROMIDE AND ALBUTEROL 2 PUFF: 20; 100 SPRAY, METERED RESPIRATORY (INHALATION) at 14:15

## 2020-12-30 RX ADMIN — REMDESIVIR 100 MG: 100 INJECTION, POWDER, LYOPHILIZED, FOR SOLUTION INTRAVENOUS at 16:36

## 2020-12-30 RX ADMIN — GUAIFENESIN AND CODEINE PHOSPHATE 5 ML: 100; 10 SOLUTION ORAL at 21:43

## 2020-12-30 RX ADMIN — ENOXAPARIN SODIUM 40 MG: 40 INJECTION SUBCUTANEOUS at 00:03

## 2020-12-30 RX ADMIN — GUAIFENESIN AND CODEINE PHOSPHATE 5 ML: 100; 10 SOLUTION ORAL at 14:15

## 2020-12-30 RX ADMIN — IPRATROPIUM BROMIDE AND ALBUTEROL 2 PUFF: 20; 100 SPRAY, METERED RESPIRATORY (INHALATION) at 09:29

## 2020-12-30 RX ADMIN — IPRATROPIUM BROMIDE AND ALBUTEROL 2 PUFF: 20; 100 SPRAY, METERED RESPIRATORY (INHALATION) at 17:49

## 2020-12-30 RX ADMIN — GUAIFENESIN AND CODEINE PHOSPHATE 5 ML: 100; 10 SOLUTION ORAL at 09:28

## 2020-12-30 RX ADMIN — ENOXAPARIN SODIUM 40 MG: 40 INJECTION SUBCUTANEOUS at 14:15

## 2020-12-30 RX ADMIN — DEXAMETHASONE 6 MG: 2 TABLET ORAL at 09:28

## 2020-12-30 RX ADMIN — IPRATROPIUM BROMIDE AND ALBUTEROL 2 PUFF: 20; 100 SPRAY, METERED RESPIRATORY (INHALATION) at 21:43

## 2020-12-30 ASSESSMENT — ACTIVITIES OF DAILY LIVING (ADL)
ADLS_ACUITY_SCORE: 15

## 2020-12-30 NOTE — PROGRESS NOTES
[facility]    Medicine Progress Note - Hospitalist Service       Date of Admission:  12/26/2020  Assessment & Plan       Bahman Wolf is a very pleasant 35 year old male who presents with shortness of breath, cough, and COVID-19 pneumonia     COVID-19 pneumonia with mild hypoxia  Diagnosed with COVID-19 12/17. Was in ED 12/24 with c/o cough.  He was stable to be discharged but returned 2 days later with ongoing issues with cough and dyspnea. O2 sats reportedly in 88-91% range in the ED.  CXR with bilateral patchy opacities, consistent with COVID-19.   - continue dexamethasone 6 mg daily (started 12/26)  - remdesivir x 5 days - final dose this evening 12/30  - lovenox for VTE px (40 bid given) - consider VTE px at discharge pending his activity level  - continuous O2 monitoring, supplemental O2 as needed  - encourage IS  - prn antitussives  - prn combivent inhaler  - monitor inflammatory markers - CRP improving as of 12/29:  20.7 <-- 56 <-- 100, D dimer 0.4 from 0.5 previously  - 12/30 - on RA and improving, was taken off of nasal cannula last night.  Hopeful to discharge home tomorrow morning if improvement continued.     Hypokalemia  - Replace per protocol     Morbid obesity  BMI 46. Lifestyle modifications and further management with PCP upon discharge.        Diet: Combination Diet Regular Diet Adult    DVT Prophylaxis: Enoxaparin (Lovenox) SQ  Edward Catheter: not present  Code Status: Full Code           Disposition Plan   Expected discharge: Tomorrow, recommended to prior living arrangement if he continues to improve and after last remdes dose tonight.  Entered: Parker Navarro MD 12/30/2020, 2:03 PM       The patient's care was discussed with the Bedside Nurse and Patient.    Parker Navarro MD  Hospitalist Service  [facility]  Contact information available via [facility]  [place]/Directory    ______________________________________________________________________    Interval History   Seen and examined earlier today. Off O2 last evening, no new complaints. SOB and cough improving, no recurrence of fevers, chills, or new pain. Remdesivir #5 this evening; probable discharge home tomorrow assuming continued improvement.    Data reviewed today: I reviewed all medications, new labs and imaging results over the last 24 hours.    Physical Exam   Vital Signs: Temp: 98.4  F (36.9  C) Temp src: Oral BP: 125/79 Pulse: 81   Resp: 20 SpO2: 93 % O2 Device: None (Room air)    Weight: 308 lbs 6.4 oz    Gen: NAD, pleasant  HEENT: Normocephalic, EOMI, MMM  Resp: no focal crackles,  no wheezes, no increased work of resp  CV: S1S2 heard, reg rhythm, reg rate, no pitting pedal edema  Abdo: soft, nontender, nondistended, bowel sounds present  Ext: calves nontender, well perfused  Neuro: AAOx3, CN grossly intact, no facial asymmetry      Data   Recent Labs   Lab 12/29/20  0900 12/28/20  0712 12/27/20  0713 12/26/20  2118 12/26/20  2118 12/24/20  0047   WBC 5.3 4.1 2.3*  --  4.4 4.1   HGB 14.7 13.9 13.9  --  13.7 12.7*   MCV 81 80 82  --  81 84    309 225  --  222 190    137 138  --  137 140   POTASSIUM 3.5 3.7 4.1   < > 3.3* 3.3*   CHLORIDE 105 104 105  --  104 108   CO2 29 28 31  --  29 31   BUN 16 12 6*  --  5* 6*   CR 0.54* 0.48* 0.54*  --  0.57* 0.59*   ANIONGAP 7 5 2*  --  4 1*   EDWARD 8.2* 8.1* 7.8*  --  7.6* 7.3*   * 148* 166*  --  141* 142*   ALBUMIN  --   --   --   --  2.9*  --    PROTTOTAL  --   --   --   --  7.2  --    BILITOTAL  --   --   --   --  0.3  --    ALKPHOS  --   --   --   --  73  --    ALT  --   --   --   --  121*  --    AST  --   --   --   --  100*  --    TROPI  --   --  <0.015  --  <0.015 <0.015    < > = values in this interval not displayed.     No results found for this or any previous visit (from the past 24 hour(s)).

## 2020-12-30 NOTE — PLAN OF CARE
Addendum: [id number]  Patient had VSS, denied any chest pain and SOB. Patient remains on RA. Cough better. Last dose of antibiotic tomorrow, patient hoping to discharge.

## 2020-12-30 NOTE — PLAN OF CARE
DATE & TIME: 12/30/20  Cognitive Concerns/ Orientation : A&Ox4, pleasant  BEHAVIOR & AGGRESSION TOOL COLOR: Green  CIWA SCORE: N/A  ABNL VS/O2: VSS on RA. O2 sats 92-96%. PADILLA  MOBILITY: Independent in room  PAIN MANAGMENT: Denies  DIET: Regular, good appetite   BOWEL/BLADDER: Continent, up to BR  ABNL LAB/BG: Covid +, CRP 20.7 trending down  DRAIN/DEVICES: L arm PIV SL  TELEMETRY RHYTHM: N/A   SKIN: Scattered bruising   TESTS/PROCEDURES: None scheduled  D/C DAY/GOALS/PLACE: Refused transfer to [facility]. Discharge home most likely tomorrow  OTHER IMPORTANT INFO: Pt on Remdesivir and Decadron. Frequent cough, PRN Robitussin with codeine given x1 with relief. Special precautions maintained.

## 2020-12-30 NOTE — PLAN OF CARE
DATE & TIME: 12/29/20 1900-0730  Cognitive Concerns/ Orientation : A&O x4,   BEHAVIOR & AGGRESSION TOOL COLOR: Green  CIWA SCORE: N/A  ABNL VS/O2: VSS on RA. O2 sats 92-96%. PADILLA  MOBILITY: Independent in room  PAIN MANAGMENT: Denies  DIET: Regular   BOWEL/BLADDER: Continent, up to BR  ABNL LAB/BG: Covid +, CRP 20.7  DRAIN/DEVICES: L arm PIV SL  TELEMETRY RHYTHM: N/A   SKIN: Scattered bruising   TESTS/PROCEDURES: None scheduled  D/C DAY/GOALS/PLACE: Refused transfer to [facility]. Discharge home pending improvement   OTHER IMPORTANT INFO: Pt on Remdesivir and Decadron. Frequent dry cough, PRN Robitussin available, none given. Special precautions maintained.    LAST DOSE REMDESIVIR 12/31

## 2020-12-31 VITALS
SYSTOLIC BLOOD PRESSURE: 121 MMHG | BODY MASS INDEX: 46.74 KG/M2 | WEIGHT: 308.4 LBS | HEIGHT: 68 IN | RESPIRATION RATE: 18 BRPM | OXYGEN SATURATION: 96 % | DIASTOLIC BLOOD PRESSURE: 82 MMHG | TEMPERATURE: 98.1 F | HEART RATE: 76 BPM

## 2020-12-31 PROCEDURE — 250N000013 HC RX MED GY IP 250 OP 250 PS 637: Performed by: INTERNAL MEDICINE

## 2020-12-31 PROCEDURE — 250N000011 HC RX IP 250 OP 636: Performed by: INTERNAL MEDICINE

## 2020-12-31 PROCEDURE — 250N000012 HC RX MED GY IP 250 OP 636 PS 637: Performed by: INTERNAL MEDICINE

## 2020-12-31 RX ORDER — DEXAMETHASONE 6 MG/1
6 TABLET ORAL DAILY
Qty: 5 TABLET | Refills: 0 | Status: SHIPPED | OUTPATIENT
Start: 2021-01-01 | End: 2021-01-06

## 2020-12-31 RX ORDER — DEXTROMETHORPHAN HBR. AND GUAIFENESIN 10; 100 MG/5ML; MG/5ML
5 SOLUTION ORAL 4 TIMES DAILY PRN
Qty: 180 ML | Refills: 1 | Status: SHIPPED | OUTPATIENT
Start: 2020-12-31

## 2020-12-31 RX ORDER — ALBUTEROL SULFATE 90 UG/1
2 AEROSOL, METERED RESPIRATORY (INHALATION) EVERY 4 HOURS PRN
Qty: 6.7 G | Refills: 0 | Status: SHIPPED | OUTPATIENT
Start: 2020-12-31

## 2020-12-31 RX ADMIN — IPRATROPIUM BROMIDE AND ALBUTEROL 2 PUFF: 20; 100 SPRAY, METERED RESPIRATORY (INHALATION) at 13:33

## 2020-12-31 RX ADMIN — ACETAMINOPHEN 650 MG: 325 TABLET, FILM COATED ORAL at 18:07

## 2020-12-31 RX ADMIN — Medication 1 LOZENGE: at 01:21

## 2020-12-31 RX ADMIN — DEXAMETHASONE 6 MG: 2 TABLET ORAL at 09:46

## 2020-12-31 RX ADMIN — IPRATROPIUM BROMIDE AND ALBUTEROL 2 PUFF: 20; 100 SPRAY, METERED RESPIRATORY (INHALATION) at 09:46

## 2020-12-31 RX ADMIN — ENOXAPARIN SODIUM 40 MG: 40 INJECTION SUBCUTANEOUS at 00:04

## 2020-12-31 RX ADMIN — ENOXAPARIN SODIUM 40 MG: 40 INJECTION SUBCUTANEOUS at 13:33

## 2020-12-31 RX ADMIN — GUAIFENESIN AND CODEINE PHOSPHATE 5 ML: 100; 10 SOLUTION ORAL at 09:46

## 2020-12-31 RX ADMIN — GUAIFENESIN AND CODEINE PHOSPHATE 5 ML: 100; 10 SOLUTION ORAL at 05:41

## 2020-12-31 RX ADMIN — GUAIFENESIN AND CODEINE PHOSPHATE 5 ML: 100; 10 SOLUTION ORAL at 18:07

## 2020-12-31 RX ADMIN — IPRATROPIUM BROMIDE AND ALBUTEROL 2 PUFF: 20; 100 SPRAY, METERED RESPIRATORY (INHALATION) at 18:07

## 2020-12-31 ASSESSMENT — ACTIVITIES OF DAILY LIVING (ADL)
ADLS_ACUITY_SCORE: 15

## 2020-12-31 NOTE — DISCHARGE SUMMARY
"[facility]    Discharge Summary  Hospitalist    Date of Admission:  12/26/2020  Date of Discharge:  12/31/2020  Discharging Provider:  ACOSTA Velásquez MD, FACP     Date of Service (when I saw the patient): 12/31/20    Discharge Diagnoses      COVID-19 pneumonia with mild hypoxia  Acute hypoxic respiratory failure  Hypokalemia  Morbid obesity  Hyperglycemia  Abnormal liver function tests  Tobacco use/possible dependence      History of Present Illness    Per admit H & P: Bahman Wolf is a pleasant 35 year old male who presents with cough and shortness of breath.  He reports that he went to [facility] in [facility] 17 December secondary to fevers to get a Covid test.  He was called that this was positive.  On the 24th he was in the emergency department with complaints of cough.  At that time he was stable and was discharged home.  He returned to the emergency department today with complaints of severe cough and shortness of breath.  He also has had some headache.  He is also complaining of diffuse muscle aches in his stomach back, \"all muscles \".  He has had some vomiting but no diarrhea.  He reports prior to presentation he had a fever to 105 degrees.      Hospital Course   Bahman Wolf was admitted on 12/26/2020.  The following problems were addressed during his hospitalization:    COVID-19 pneumonia with mild hypoxia  Acute hypoxic respiratory failure  Diagnosed with COVID-19 12/17. Was in ED 12/24 with c/o cough.  He was stable to be discharged but returned 2 days later with ongoing issues with cough and dyspnea. O2 sats reportedly in 88-91% range in the ED.  CXR with bilateral patchy opacities, consistent with COVID-19.   - required 1-2L of 02 via n/c earlier in this admission, but was gradually tapered to room air.  - continue dexamethasone 6 mg daily (started 12/26) for additional 5 days  - had Remdesivir x 5 days - final dose 12/30  - had Lovenox for VTE px (40 bid " given) --> will be discharged on Rivaroxaban 10 mg daily x 30 days  - encouraged continued use of I/S  - PRN antitussives  - continue PRN albuterol HFA     Hypokalemia; corrected.     Morbid obesity; BMI 46.   - needs increased exercise as soon as able, improvement in diet and further management with PCP at follow up.    Hyperglycemia; concern for possible underlying diabetes.  - check Hgb A1C at some time following completion of decadron; review with PCP at follow up.    Abnormal liver function tests; unknown cause.  - further review with PCP at follow up.    Tobacco use/possible dependence  - further review with PCP at follow up.      ACOSTA Velásquez MD, FACP    Hospitalist     Significant Results and Procedures   See below and in EHR.    Pending Results   None.    Unresulted Labs Ordered in the Past 30 Days of this Admission     Date and Time Order Name Status Description    12/26/2020 2046 Blood culture Preliminary           Code Status   Full Code       Primary Care Physician   [facility]    Physical Exam   Temp: 98.1  F (36.7  C) Temp src: Oral BP: 121/82 Pulse: 76   Resp: 18 SpO2: 96 % O2 Device: None (Room air)    Vitals:    12/26/20 2039 12/26/20 2347   Weight: 99.8 kg (220 lb) 139.9 kg (308 lb 6.4 oz)     Vital Signs with Ranges  Temp:  [97.9  F (36.6  C)-98.2  F (36.8  C)] 98.1  F (36.7  C)  Pulse:  [64-76] 76  Resp:  [18-20] 18  BP: (107-121)/(66-82) 121/82  SpO2:  [95 %-97 %] 96 %  No intake/output data recorded.    Constitutional: awake, no apparent distress; sitting on edge of bed  HEENT: sclerae clear; MM's moist  Respiratory: good a/e bilaterally, no wheezing or rhonchi  Cardiovascular: Regular rate and rhythm, S1, S2 noted; no m/r/g  GI: abdomen obese, + bowel sounds; soft, non-tender, non-distended  Skin/Integumen: no rashes, no cyanosis, no jaundice  Musculoskeletal: no edema  Neurologic: follows directions well; no focal deficits     Discharge Disposition   Discharged to  home  Condition at discharge: Stable    Consultations This Hospital Stay   PHARMACY LIAISON FOR MEDICATION COVERAGE CONSULT    Time Spent on this Encounter   I, Nic Velásquez MD, personally saw the patient today and spent greater than 30 minutes discharging this patient.    Discharge Orders      Reason for your hospital stay    You were admitted for evaluation of acute respiratory failure and have made good progress.     Follow-up and recommended labs and tests     1. Follow up with primary care provider, [facility], within 7 days to evaluate medication change, to evaluate treatment change and for hospital follow- up.  No follow up labs or test are needed.    2. Follow up with RD in clinic re. Diet, obesity, hyperglycemia.     Activity    Your activity upon discharge: activity as tolerated and ambulate in house     Full Code     Diet    Follow this diet upon discharge: Orders Placed This Encounter      Combination Diet Regular Diet Adult; limit to moderate CHO/low fat/low cholesterol if able.     Discharge Medications   Current Discharge Medication List      START taking these medications    Details   dexamethasone (DECADRON) 6 MG tablet Take 1 tablet (6 mg) by mouth daily for 5 days  Qty: 5 tablet, Refills: 0    Associated Diagnoses: Pneumonia due to 2019 novel coronavirus; Acute respiratory failure with hypoxia (H)      dextromethorphan-guaiFENesin (TUSSIN DM)  MG/5ML liquid Take 5 mLs by mouth 4 times daily as needed (Cough, chest congestion.)  Qty: 180 mL, Refills: 1    Associated Diagnoses: Pneumonia due to 2019 novel coronavirus      rivaroxaban ANTICOAGULANT (XARELTO) 10 MG TABS tablet Take 10 mg once daily for 30 days; review duration with PCP at follow up within the next 7 days.  Qty: 30 tablet, Refills: 0    Comments: Future refills by primary care physician or cardiologist  Associated Diagnoses: Pneumonia due to 2019 novel coronavirus         CONTINUE these medications which have  CHANGED    Details   albuterol (PROAIR HFA/PROVENTIL HFA/VENTOLIN HFA) 108 (90 Base) MCG/ACT inhaler Inhale 2 puffs into the lungs every 4 hours as needed for shortness of breath / dyspnea or wheezing  Qty: 6.7 g, Refills: 0    Comments: Pharmacy may dispense brand covered by insurance (Proair, or proventil or ventolin or generic albuterol inhaler)  Associated Diagnoses: Pneumonia due to 2019 novel coronavirus         STOP taking these medications       oxyCODONE-acetaminophen (PERCOCET) 5-325 MG per tablet Comments:   Reason for Stopping:             Allergies   No Known Allergies  Data   Most Recent 3 CBC's:  Recent Labs   Lab Test 12/29/20  0900 12/28/20  0712 12/27/20  0713   WBC 5.3 4.1 2.3*   HGB 14.7 13.9 13.9   MCV 81 80 82    309 225      Most Recent 3 BMP's:  Recent Labs   Lab Test 12/29/20  0900 12/28/20  0712 12/27/20  0713    137 138   POTASSIUM 3.5 3.7 4.1   CHLORIDE 105 104 105   CO2 29 28 31   BUN 16 12 6*   CR 0.54* 0.48* 0.54*   ANIONGAP 7 5 2*   EDWARD 8.2* 8.1* 7.8*   * 148* 166*     Most Recent 2 LFT's:  Recent Labs   Lab Test 12/26/20  2118 05/15/15  2119   * 87*   * 50   ALKPHOS 73 102   BILITOTAL 0.3 0.3     Most Recent INR's and Anticoagulation Dosing History:  Anticoagulation Dose History     There is no flowsheet data to display.        Most Recent 3 Troponin's:  Recent Labs   Lab Test 12/27/20  0713 12/26/20 2118 12/24/20  0047   TROPI <0.015 <0.015 <0.015     Most Recent Cholesterol Panel:No lab results found.  Most Recent 6 Bacteria Isolates From Any Culture (See EPIC Reports for Culture Details):  Recent Labs   Lab Test 12/26/20 2118   CULT No growth after 5 days     Most Recent TSH, T4 and A1c Labs:No lab results found.  Results for orders placed or performed during the hospital encounter of 12/26/20   XR Chest Port 1 View    Narrative    CHEST ONE VIEW PORTABLE  12/26/2020 9:37 PM     HISTORY: Dyspnea/shortness of breath.    COMPARISON: Chest x-ray on  12/24/2020.      Impression    IMPRESSION: Portable AP view of the chest was obtained.  Cardiomediastinal silhouette is within normal limits. Multiple  bilateral patchy airspace pulmonary opacities, worrisome for infection  including atypical infection like COVID-19. No significant pleural  effusion or pneumothorax.    CAROLINA SOLANO MD

## 2020-12-31 NOTE — PROVIDER NOTIFICATION
MD Notification    Notified Person: MD    Notified Person Name: Kandice Munoz MD    Notification Date/Time:12/30/2020 0152    Notification Interaction: Web page    Purpose of Notification:612 c/o sore throat can you order Lozenges?    Orders Received:pensing    Comments:

## 2020-12-31 NOTE — PLAN OF CARE
DATE & TIME: 12/31/20 3448-2036  Cognitive Concerns/ Orientation : A&Ox4  BEHAVIOR & AGGRESSION TOOL COLOR: Green  CIWA SCORE: N/A  ABNL VS/O2: VSS on RA, PADILLA  MOBILITY: Independent in room  PAIN MANAGMENT: Denies  DIET: Regular, good appetite   BOWEL/BLADDER: Continent, up to BR  ABNL LAB/BG: Covid positive  DRAIN/DEVICES: L arm PIV SL  TELEMETRY RHYTHM: N/A   SKIN: Scattered bruising   TESTS/PROCEDURES: None scheduled  D/C DAY/GOALS/PLACE: Discharge home most likely this evening.  OTHER IMPORTANT INFO: Pt on Decadron. Frequent cough, PRN Robitussin with codeine given x1 with relief. Special precautions maintained.

## 2020-12-31 NOTE — CONSULTS
Consulted to run test claims for covered post-discharge NOAC for VTE prophylaxis.    Patient has pharmacy benefits through [facility] ([place] Medicaid plan) with no deductible. Coverage of NOAC drugs listed below.      Covered:    Xarelto - $0.00 copay    Pradaxa - $0.00 copay    Not Covered:    Eliquis     Coverage requires failure of at least two preferred alts, including preferred NOACs, warfarin, & lovenox      Please feel free to contact me with any other test claims, prior authorizations, or insurance questions regarding outpatient medications.     Thanks!      Kerry De León CPhT  [place] Discharge Pharmacy Liaison  Pronouns: She/Her/Hers  (covering for Yajaira Newsome/[place] Discharge Pharmacy Liaison)    [facility]  [facility]  [address] [place] [address] [address], [place] [address]   [email or web address]  [email or web address]   Phone: [phone]  Pager: [phone]  Fax: [phone]

## 2020-12-31 NOTE — PLAN OF CARE
DATE & TIME: 12/30/20 2042-8778  Cognitive Concerns/ Orientation : A&Ox4, pleasant  BEHAVIOR & AGGRESSION TOOL COLOR: Green  CIWA SCORE: N/A  ABNL VS/O2: VSS on RA, PADILLA  MOBILITY: Independent in room  PAIN MANAGMENT: c/o sore throat Lozenges given x1 and Robitussin given x1 for cough   DIET: Regular, good appetite   BOWEL/BLADDER: Continent, up to BR  ABNL LAB/BG: Covid +, CRP 20.7 trending down  DRAIN/DEVICES: L arm PIV SL  TELEMETRY RHYTHM: N/A   SKIN: Scattered bruising   TESTS/PROCEDURES: None scheduled  D/C DAY/GOALS/PLACE: Refused transfer to [facility]. Discharge home most likely today.  OTHER IMPORTANT INFO: Pt done Remdesivir course and on Decadron. Frequent cough, PRN Lozenges and Robitussin with codeine given x1 with relief. Special precautions maintained.  COMMIT TO SIT DONE AND SIGNED OFF: yes

## 2020-12-31 NOTE — PLAN OF CARE
DATE & TIME: 12/30/20 4219-6709  Cognitive Concerns/ Orientation : A&Ox4, pleasant  BEHAVIOR & AGGRESSION TOOL COLOR: Green  CIWA SCORE: N/A  ABNL VS/O2: VSS on RA, PADILLA  MOBILITY: Independent in room  PAIN MANAGMENT: Denies  DIET: Regular, good appetite   BOWEL/BLADDER: Continent, up to BR  ABNL LAB/BG: Covid +, CRP 20.7 trending down  DRAIN/DEVICES: L arm PIV SL  TELEMETRY RHYTHM: N/A   SKIN: Scattered bruising   TESTS/PROCEDURES: None scheduled  D/C DAY/GOALS/PLACE: Refused transfer to [facility]. Discharge home most likely tomorrow.  OTHER IMPORTANT INFO: Pt on Remdesivir and Decadron. Frequent cough, PRN Robitussin with codeine given x1 with relief. Special precautions maintained.

## 2021-01-01 LAB
BACTERIA SPEC CULT: NO GROWTH
SPECIMEN SOURCE: NORMAL

## 2021-01-01 NOTE — DISCHARGE INSTRUCTIONS
"Discharge Instructions for COVID-19 Patients  You have--or may have--COVID-19. Please follow the instructions listed below.   If you have a weakened immune system, discuss with your doctor any other actions you need to take.  How can I protect others?  If you have symptoms (fever, cough, body aches or trouble breathing):    Stay home and away from others (self-isolate) until:  ? At least 10 days have passed since your symptoms started, And   ? You've had no fever--and no medicine that reduces fever--for 1 full day (24 hours), And    ? Your other symptoms have resolved (gotten better).  If you don't show symptoms, but testing showed that you have COVID-19:    Stay home and away from others (self-isolate). Follow the tips under \"How do I self-isolate?\" below for 10 days (20 days if you have a weak immune system).    You don't need to be retested for COVID-19 before going back to school or work. As long as you're fever-free and feeling better, you can go back to school, work and other activities after waiting the 10 or 20 days.   How do I self-isolate?    Stay in your own room, even for meals. Use your own bathroom if you can.    Stay away from others in your home. No hugging, kissing or shaking hands. No visitors.    Don't go to work, school or anywhere else.    Clean \"high touch\" surfaces often (doorknobs, counters, handles). Use household cleaning spray or wipes. You'll find a full list of  on the EPA website: www.epa.gov/pesticide-registration/list-n-disinfectants-use-against-sars-cov-2.    Cover your mouth and nose with a mask or other face covering to avoid spreading germs.    Wash your hands and face often. Use soap and water.    Caregivers in these groups are at risk for severe illness due to COVID-19:  ? People 65 years and older  ? People who live in a nursing home or long-term care facility  ? People with chronic disease (lung, heart, cancer, diabetes, kidney, liver, immunologic)  ? People who have a " weakened immune system, including those who:    Are in cancer treatment    Take medicine that weakens the immune system, such as corticosteroids    Had a bone marrow or organ transplant    Have an immune deficiency    Have poorly controlled HIV or AIDS    Are obese (body mass index of 40 or higher)    Smoke regularly    Caregivers should wear gloves while washing dishes, handling laundry and cleaning bedrooms and bathrooms.    Use caution when washing and drying laundry: Don't shake dirty laundry and use the warmest water setting that you can.    For more tips on managing your health at home, go to www.cdc.gov/coronavirus/2019-ncov/downloads/10Things.pdf.  How can I take care of myself at home?  1. Get lots of rest. Drink extra fluids (unless a doctor has told you not to).    2. Take Tylenol (acetaminophen) for fever or pain. If you have liver or kidney problems, ask your family doctor if it's okay to take Tylenol.     Adults can take either:  ? 650 mg (two 325 mg pills) every 4 to 6 hours, or   ? 1,000 mg (two 500 mg pills) every 8 hours as needed.  ? Note: Don't take more than 3,000 mg in one day. Acetaminophen is found in many medicines (both prescribed and over-the-counter medicines). Read all labels to be sure you don't take too much.   For children, check the Tylenol bottle for the right dose. The dose is based on the child's age or weight.  3. If you have other health problems (like cancer, heart failure, an organ transplant or severe kidney disease): Call your specialty clinic if you don't feel better in the next 2 days.    4. Know when to call 911. Emergency warning signs include:  ? Trouble breathing or shortness of breath  ? Pain or pressure in the chest that doesn't go away  ? Feeling confused like you haven't felt before, or not being able to wake up  ? Bluish-colored lips or face    5. Your doctor may have prescribed a blood thinner medicine. Follow their instructions.  Where can I get more  information?    [facility] - About COVID-19: [email or web address]    CDC - What to Do If You're Sick: www.cdc.gov/coronavirus/2019-ncov/about/steps-when-sick.html    CDC - Ending Home Isolation: www.cdc.gov/coronavirus/2019-ncov/hcp/disposition-in-home-patients.html    CDC - Caring for Someone: www.cdc.gov/coronavirus/2019-ncov/if-you-are-sick/care-for-someone.html    [facility] - Interim Guidance for Hospital Discharge to Home: www.health.Counts include 234 beds at the [facility].[place]./diseases/coronavirus/hcp/hospdischarge.pdf    [facility] clinical trials (COVID-19 research studies): clinicalaffairs.[facility]-clinical-trials    Below are the COVID-19 hotlines at the [place] Department of Health ([facility]). Interpreters are available.  ? For health questions: Call 332-148-3722 or 1-363.908.4759 (7 a.m. to 7 p.m.)  ? For questions about schools and childcare: Call 769-836-6350 or 1-163.601.7222 (7 a.m. to 7 p.m.)    For informational purposes only. Not to replace the advice of your health care provider. Clinically reviewed by the Infection Prevention Team. Copyright   2020 [employer]. All rights reserved. [employer] [id number] - REV 08/04/20.

## 2021-01-01 NOTE — PLAN OF CARE
A&OX4, VSS on RA. Denies SOB, pain , n&V. Does get PADILLA but able to maintain sats with vigorous activity. Up independently. All discharge instructions and meds reviewed with pt. Pt verbalized understanding. All belongings returned to pt. Pt stable at time of discharge.

## 2022-03-21 NOTE — TELEPHONE ENCOUNTER
DIAGNOSIS: Lower back pain   APPOINTMENT DATE: 03/31/2022   NOTES STATUS DETAILS   OFFICE NOTE from referring provider N/A    OFFICE NOTE from other specialist N/A    DISCHARGE SUMMARY from hospital N/A    DISCHARGE REPORT from the ER N/A    OPERATIVE REPORT N/A    EMG report N/A    MEDICATION LIST N/A    MRI N/A    DEXA (osteoporosis/bone health) N/A    CT SCAN N/A    XRAYS (IMAGES & REPORTS) N/A

## 2022-03-31 ENCOUNTER — PRE VISIT (OUTPATIENT)
Dept: ORTHOPEDICS | Facility: CLINIC | Age: 37
End: 2022-03-31
Payer: COMMERCIAL

## 2022-03-31 ENCOUNTER — OFFICE VISIT (OUTPATIENT)
Dept: ORTHOPEDICS | Facility: CLINIC | Age: 37
End: 2022-03-31
Payer: COMMERCIAL

## 2022-03-31 ENCOUNTER — ANCILLARY PROCEDURE (OUTPATIENT)
Dept: GENERAL RADIOLOGY | Facility: CLINIC | Age: 37
End: 2022-03-31
Attending: FAMILY MEDICINE
Payer: COMMERCIAL

## 2022-03-31 DIAGNOSIS — M54.50 LOW BACK PAIN: ICD-10-CM

## 2022-03-31 DIAGNOSIS — M54.50 ACUTE BILATERAL LOW BACK PAIN WITHOUT SCIATICA: Primary | ICD-10-CM

## 2022-03-31 PROCEDURE — 72100 X-RAY EXAM L-S SPINE 2/3 VWS: CPT | Mod: GC | Performed by: RADIOLOGY

## 2022-03-31 PROCEDURE — 99204 OFFICE O/P NEW MOD 45 MIN: CPT | Performed by: FAMILY MEDICINE

## 2022-03-31 RX ORDER — CYCLOBENZAPRINE HCL 10 MG
10 TABLET ORAL 3 TIMES DAILY PRN
Qty: 30 TABLET | Refills: 1 | Status: SHIPPED | OUTPATIENT
Start: 2022-03-31

## 2022-03-31 RX ORDER — MELOXICAM 15 MG/1
15 TABLET ORAL DAILY
Qty: 14 TABLET | Refills: 0 | Status: SHIPPED | OUTPATIENT
Start: 2022-03-31

## 2022-03-31 NOTE — LETTER
3/31/2022         RE: Bahman Wolf  [address] [place] [facility] [address]        Dear Colleague,    Thank you for referring your patient, Bahman Wolf, to the [facility] [place]. Please see a copy of my visit note below.      HISTORY OF PRESENT ILLNESS  Mr. Wolf is a pleasant 37 year old male who presents to clinic today with back pain.  Bahman has been experiencing pain in his low back for the past month.  No clear inciting event that he can recall.  He points to his lumbar spine at the center, pain radiates to the right and to the left.  He denies any pain that radiates down his legs.  He did have a episode of low back pain previously as a younger man, this was after he fell off of a stairwell from a significant height.  He was treated nonoperatively and did well.      Additional history: as documented    MEDICAL HISTORY  Patient Active Problem List   Diagnosis     Acute respiratory failure with hypoxia (H)     Pneumonia due to 2019 novel coronavirus       Current Outpatient Medications   Medication Sig Dispense Refill     albuterol (PROAIR HFA/PROVENTIL HFA/VENTOLIN HFA) 108 (90 Base) MCG/ACT inhaler Inhale 2 puffs into the lungs every 4 hours as needed for shortness of breath / dyspnea or wheezing 6.7 g 0     albuterol (PROAIR HFA;PROVENTIL HFA;VENTOLIN HFA) 90 mcg/actuation inhaler [ALBUTEROL (PROAIR HFA;PROVENTIL HFA;VENTOLIN HFA) 90 MCG/ACTUATION INHALER] Inhale 2 puffs every 4 (four) hours as needed for wheezing.       dexamethasone (DECADRON) 6 MG tablet Take 1 tablet (6 mg) by mouth daily for 5 days 5 tablet 0     dextromethorphan-guaiFENesin (TUSSIN DM)  MG/5ML liquid Take 5 mLs by mouth 4 times daily as needed (Cough, chest congestion.) 180 mL 1     oxyCODONE-acetaminophen (PERCOCET/ENDOCET) 5-325 mg per tablet [OXYCODONE-ACETAMINOPHEN (PERCOCET/ENDOCET) 5-325 MG PER TABLET] Take 1-2 tablets by mouth every 4 (four) hours as needed for  pain (for moderate to severe pain).       rivaroxaban ANTICOAGULANT (XARELTO) 10 MG TABS tablet Take 10 mg once daily for 30 days; review duration with PCP at follow up within the next 7 days. 30 tablet 0       No Known Allergies    No family history on file.    Additional medical/Social/Surgical histories reviewed in Psychiatric and updated as appropriate.        PHYSICAL EXAM  General  - normal appearance, in no obvious distress  HEENT  - conjunctivae not injected, moist mucous membranes  CV  - normal peripheral perfusion  Pulm  - normal respiratory pattern, non-labored  Musculoskeletal - lumbar spine  - stance: slow to rise and sit  - palpation: bilateral lumbar paravertebral spasm, right worse than left  - ROM: pain with bilateral rotation, flexion past 30 deg, extension  - strength: lower extremities 5/5 in all planes  - special tests:  (-) slump test  Neuro  - patellar and Achilles DTRs 2+ bilaterally, no sensory or motor deficit, grossly normal coordination, normal muscle tone  Skin  - no ecchymosis, erythema, warmth, or induration, no obvious rash  Psych  - interactive, appropriate, normal mood and affect             ASSESSMENT & PLAN  Mr. Wolf is a 37 year old male who presents to clinic today with back pain.    We ordered and independently reviewed an x-ray of his lumbar spine, this shows no obvious acute or chronic issues.    His pain does seem musculoligamentous in nature, we discussed pathophysiology and some treatment strategies including physical therapy and medications.  I did prescribe him Flexeril and Mobic, he should take Mobic every day for the next 14 days.  I am also referring him to physical therapy.    If these do not help the symptoms whatsoever in the coming weeks I would likely recommend MR imaging.    It was a pleasure seeing Bahman today.    Nic Reddy DO, [facility]  Primary Care Sports Medicine      This note was constructed using Dragon dictation software, please excuse any minor errors  in spelling, grammar, or syntax.        Again, thank you for allowing me to participate in the care of your patient.        Sincerely,        Nic Reddy, DO

## 2022-03-31 NOTE — PROGRESS NOTES
HISTORY OF PRESENT ILLNESS  Mr. Wolf is a pleasant 37 year old male who presents to clinic today with back pain.  Bahman has been experiencing pain in his low back for the past month.  No clear inciting event that he can recall.  He points to his lumbar spine at the center, pain radiates to the right and to the left.  He denies any pain that radiates down his legs.  He did have a episode of low back pain previously as a younger man, this was after he fell off of a stairwell from a significant height.  He was treated nonoperatively and did well.      Additional history: as documented    MEDICAL HISTORY  Patient Active Problem List   Diagnosis     Acute respiratory failure with hypoxia (H)     Pneumonia due to 2019 novel coronavirus       Current Outpatient Medications   Medication Sig Dispense Refill     albuterol (PROAIR HFA/PROVENTIL HFA/VENTOLIN HFA) 108 (90 Base) MCG/ACT inhaler Inhale 2 puffs into the lungs every 4 hours as needed for shortness of breath / dyspnea or wheezing 6.7 g 0     albuterol (PROAIR HFA;PROVENTIL HFA;VENTOLIN HFA) 90 mcg/actuation inhaler [ALBUTEROL (PROAIR HFA;PROVENTIL HFA;VENTOLIN HFA) 90 MCG/ACTUATION INHALER] Inhale 2 puffs every 4 (four) hours as needed for wheezing.       dexamethasone (DECADRON) 6 MG tablet Take 1 tablet (6 mg) by mouth daily for 5 days 5 tablet 0     dextromethorphan-guaiFENesin (TUSSIN DM)  MG/5ML liquid Take 5 mLs by mouth 4 times daily as needed (Cough, chest congestion.) 180 mL 1     oxyCODONE-acetaminophen (PERCOCET/ENDOCET) 5-325 mg per tablet [OXYCODONE-ACETAMINOPHEN (PERCOCET/ENDOCET) 5-325 MG PER TABLET] Take 1-2 tablets by mouth every 4 (four) hours as needed for pain (for moderate to severe pain).       rivaroxaban ANTICOAGULANT (XARELTO) 10 MG TABS tablet Take 10 mg once daily for 30 days; review duration with PCP at follow up within the next 7 days. 30 tablet 0       No Known Allergies    No family history on file.    Additional  medical/Social/Surgical histories reviewed in [facility] and updated as appropriate.        PHYSICAL EXAM  General  - normal appearance, in no obvious distress  HEENT  - conjunctivae not injected, moist mucous membranes  CV  - normal peripheral perfusion  Pulm  - normal respiratory pattern, non-labored  Musculoskeletal - lumbar spine  - stance: slow to rise and sit  - palpation: bilateral lumbar paravertebral spasm, right worse than left  - ROM: pain with bilateral rotation, flexion past 30 deg, extension  - strength: lower extremities 5/5 in all planes  - special tests:  (-) slump test  Neuro  - patellar and Achilles DTRs 2+ bilaterally, no sensory or motor deficit, grossly normal coordination, normal muscle tone  Skin  - no ecchymosis, erythema, warmth, or induration, no obvious rash  Psych  - interactive, appropriate, normal mood and affect             ASSESSMENT & PLAN  Mr. Wofl is a 37 year old male who presents to clinic today with back pain.    We ordered and independently reviewed an x-ray of his lumbar spine, this shows no obvious acute or chronic issues.    His pain does seem musculoligamentous in nature, we discussed pathophysiology and some treatment strategies including physical therapy and medications.  I did prescribe him Flexeril and Mobic, he should take Mobic every day for the next 14 days.  I am also referring him to physical therapy.    If these do not help the symptoms whatsoever in the coming weeks I would likely recommend MR imaging.    It was a pleasure seeing Bahman today.    Nic Reddy DO, [facility]  Primary Care Sports Medicine      This note was constructed using Dragon dictation software, please excuse any minor errors in spelling, grammar, or syntax.

## 2022-06-23 NOTE — PLAN OF CARE
Summary:     DATE & TIME: 12- 2151-9302  Cognitive Concerns/ Orientation : Alert and oriented x 4   BEHAVIOR & AGGRESSION TOOL COLOR: Green  CIWA SCORE: N/A  ABNL VS/O2: VSS 93%-97% 2 lpm NC   MOBILITY: Independent   PAIN MANAGMENT: Complains of chest pain with coughing, receives Tylenol and Robitussin AC q4h PRN x2  DIET: Regular   BOWEL/BLADDER: Continent   ABNL LAB/BG: Covid +  DRAIN/DEVICES: Peripheral IV SL Left AC   TELEMETRY RHYTHM: N/A   SKIN: Intact   TESTS/PROCEDURES: N/A   D/C DAY/GOALS/PLACE: Pending   OTHER IMPORTANT INFO: Covid precautions in place. Lung sounds diminished.  Infrequent dry non productive cough. Slept majority of night         Yes-Patient/Caregiver accepts free interpretation services...